# Patient Record
Sex: MALE | Race: WHITE | NOT HISPANIC OR LATINO | Employment: FULL TIME | URBAN - METROPOLITAN AREA
[De-identification: names, ages, dates, MRNs, and addresses within clinical notes are randomized per-mention and may not be internally consistent; named-entity substitution may affect disease eponyms.]

---

## 2017-08-18 ENCOUNTER — GENERIC CONVERSION - ENCOUNTER (OUTPATIENT)
Dept: OTHER | Facility: OTHER | Age: 52
End: 2017-08-18

## 2018-01-09 NOTE — MISCELLANEOUS
Message   Recorded as Task   Date: 03/09/2016 03:57 PM, Created By: Alexi Davis   Task Name: Follow Up   Assigned To: Elaine Porter   Regarding Patient: Leander Harada, Status: Active   Comment:    Alexi Davis - 09 Mar 2016 3:57 PM     TASK CREATED  Please call patient about his cancelled appointments and to make new one please  Left message for patient  Active Problems    1  Benign essential hypertension (401 1) (I10)   2  De Quervain's tenosynovitis (727 04) (M65 4)   3  Gout (274 9) (M10 9)   4  Morbid or severe obesity due to excess calories (278 01) (E66 01)   5  Obstructive sleep apnea (327 23) (G47 33)   6  Postgastrectomy malabsorption (579 3) (K91 2,Z90 3)   7  Pre-operative cardiovascular examination (V72 81) (Z01 810)   8  Status post gastric bypass for obesity (V45 86) (Z98 84)   9  Tenosynovitis of forearm (727 05) (M65 9)   10  Wrist pain (719 43) (M25 539)    Current Meds   1  Calcium 600 MG Oral Tablet; Therapy: (Recorded:11Nov2014) to Recorded   2  Omeprazole 20 MG Oral Capsule Delayed Release; TAKE 1 CAPSULE DAILY; Therapy: 68Lol1557 to (Evaluate:42Lfv7865)  Requested for: 03Jlm4258; Last   Rx:52Chw7361 Ordered   3  Vitamin D3 5000 UNIT Oral Capsule; Therapy: (Recorded:11Nov2014) to Recorded    Allergies    1   No Known Drug Allergies    Signatures   Electronically signed by : Debby Marcos, ; Mar 10 2016  9:05AM EST                       (Author)

## 2018-01-16 NOTE — MISCELLANEOUS
Provider Comments  Provider Comments:     Dear Kaiden Mccarty had a scheduled appointment at our office for 08/21/2017 that you called to cancel but did not reschedule for another day  It is very important that you follow up with us so that we can assess your physical and nutritional safety after your surgery  Please call our office at [phone] to reschedule your appointment       Sincerely,     [facility]            Signatures   Electronically signed by : Mayra Barriga, ; Aug 18 2017  8:35AM EST                       (Author)

## 2018-01-18 NOTE — RESULT NOTES
Dear Celena Elias,   Your test results have returned and are listed below:      Plan  Health Maintenance, Postgastrectomy malabsorption, Status post  gastric bypass for obesity, Vitamin B12 deficiency    · (1) VITAMIN B12; Status:Active; Requested for:98Cfx3509;     Discussion/Summary  Your results show some abnormalities  8/5/16 labs reviewed  Your white blood cell count is low at 3 9  White blood cells help the body fight off infection  If you are having frequent illness or infection, please follow-up with your PCP for further evaluation  This can also be down temporarily after times of rapid weight loss but then should improve  Your hdl or "good cholesterol" is low at 37-please also review this with your PCP at a routine visit  SOMETIMES this can be improved with regular exercise  Exercise as tolerated    One of your liver enzymes (total bilirubin) is a little high at 1 4  This can sometimes be a normal finding when fasting for labs  Your level has stayed a little high with prior labs so I believe this is "normal" for you  You can also review with PCP at a routine visit  Your vitamin B12 level is low at 207-goal after gastric surgery is above 400   Low levels can affect your nerve health  Low levels can also lead to anemia and fatigue  Do NOT take GUMMIE multivitamins because they do not contain enough B vitamins  Alcohol intakes can also lower Vitamin b12 levels  As a gastric surgery patient, it is recommended to avoid alcohol intakes  Recommend that you take an additioanl 1000 mcg of vitamin B12 sublingually (under the tongue) per day  Your level should be rechecked in 3 months-lab slip is enclosed  IMPORTANT: if you have MEDICARE do not get the repeat lab until after December 9, 2016    Please keep your regularly scheduled follow-up appointment  If you do not have a follow-up scheduled, please call the office to schedule a follow-up visit        If you have any questions, please don't hesitate to call the office     Sincerely,      Signatures   Electronically signed by : Stefani Gomez, [facility]; Sep 14 2016  3:32PM EST                       (Author)

## 2018-01-29 ENCOUNTER — TELEPHONE (OUTPATIENT)
Dept: BARIATRICS | Facility: CLINIC | Age: 53
End: 2018-01-29

## 2018-01-29 NOTE — TELEPHONE ENCOUNTER
----- Message from Moe Pete RN sent at 2018  6:03 AM EST -----  Regardin yr  f/u appointment  Contact: [phone]  Please call patient to schedule 2 yr  f/u appointment with office  Thank you

## 2019-02-11 ENCOUNTER — TELEPHONE (OUTPATIENT)
Dept: BARIATRICS | Facility: CLINIC | Age: 54
End: 2019-02-11

## 2019-02-11 NOTE — TELEPHONE ENCOUNTER
----- Message from Giuseppe Maguire RN sent at 2/7/2019  7:27 AM EST -----  Regarding: 3 year f/u  Please contact patient to schedule a 3 year follow up appointment    Thank You

## 2019-04-08 ENCOUNTER — APPOINTMENT (EMERGENCY)
Dept: RADIOLOGY | Facility: HOSPITAL | Age: 54
End: 2019-04-08
Payer: COMMERCIAL

## 2019-04-08 ENCOUNTER — HOSPITAL ENCOUNTER (OUTPATIENT)
Facility: HOSPITAL | Age: 54
Setting detail: OBSERVATION
Discharge: HOME | End: 2019-04-09
Attending: SURGERY | Admitting: SURGERY
Payer: COMMERCIAL

## 2019-04-08 DIAGNOSIS — S06.0X1A CONCUSSION WITH LOSS OF CONSCIOUSNESS OF 30 MINUTES OR LESS, INITIAL ENCOUNTER: Primary | ICD-10-CM

## 2019-04-08 PROBLEM — S06.0X9A CONCUSSION: Status: ACTIVE | Noted: 2019-04-08
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
## 2019-04-08 PROBLEM — S06.0XAA CONCUSSION: Status: ACTIVE | Noted: 2019-04-08

## 2019-04-08 LAB
BASE EXCESS BLDA CALC-SCNC: 2 MMOL/L (ref -2–3)
CA-I BLD-SCNC: 1.14 MMOL/L (ref 1.12–1.32)
GLUCOSE SERPL-MCNC: 95 MG/DL (ref 65–140)
HCO3 BLDA-SCNC: 27.9 MMOL/L (ref 24–30)
HCT VFR BLD CALC: 45 % (ref 36.5–49.3)
HGB BLDA-MCNC: 15.3 G/DL (ref 12–17)
PCO2 BLD: 29 MMOL/L (ref 21–32)
PCO2 BLD: 48.6 MM HG (ref 42–50)
PH BLD: 7.37 [PH] (ref 7.3–7.4)
PO2 BLD: 24 MM HG (ref 35–45)
POTASSIUM BLD-SCNC: 3.8 MMOL/L (ref 3.5–5.3)
SAO2 % BLD FROM PO2: 40 % (ref 95–98)
SODIUM BLD-SCNC: 144 MMOL/L (ref 136–145)
SPECIMEN SOURCE: ABNORMAL

## 2019-04-08 PROCEDURE — 82330 ASSAY OF CALCIUM: CPT

## 2019-04-08 PROCEDURE — 73100 X-RAY EXAM OF WRIST: CPT

## 2019-04-08 PROCEDURE — 90715 TDAP VACCINE 7 YRS/> IM: CPT | Performed by: SURGERY

## 2019-04-08 PROCEDURE — 84132 ASSAY OF SERUM POTASSIUM: CPT

## 2019-04-08 PROCEDURE — 99219 PR INITIAL OBSERVATION CARE/DAY 50 MINUTES: CPT | Performed by: SURGERY

## 2019-04-08 PROCEDURE — 71260 CT THORAX DX C+: CPT

## 2019-04-08 PROCEDURE — 82803 BLOOD GASES ANY COMBINATION: CPT

## 2019-04-08 PROCEDURE — 70450 CT HEAD/BRAIN W/O DYE: CPT

## 2019-04-08 PROCEDURE — 71045 X-RAY EXAM CHEST 1 VIEW: CPT

## 2019-04-08 PROCEDURE — 73110 X-RAY EXAM OF WRIST: CPT

## 2019-04-08 PROCEDURE — 99285 EMERGENCY DEPT VISIT HI MDM: CPT

## 2019-04-08 PROCEDURE — 82947 ASSAY GLUCOSE BLOOD QUANT: CPT

## 2019-04-08 PROCEDURE — 96374 THER/PROPH/DIAG INJ IV PUSH: CPT

## 2019-04-08 PROCEDURE — 85014 HEMATOCRIT: CPT

## 2019-04-08 PROCEDURE — 72125 CT NECK SPINE W/O DYE: CPT

## 2019-04-08 PROCEDURE — 74177 CT ABD & PELVIS W/CONTRAST: CPT

## 2019-04-08 PROCEDURE — 84295 ASSAY OF SERUM SODIUM: CPT

## 2019-04-08 PROCEDURE — NC001 PR NO CHARGE: Performed by: EMERGENCY MEDICINE

## 2019-04-08 PROCEDURE — 90471 IMMUNIZATION ADMIN: CPT

## 2019-04-08 RX ORDER — HEPARIN SODIUM 5000 [USP'U]/ML
5000 INJECTION, SOLUTION INTRAVENOUS; SUBCUTANEOUS EVERY 8 HOURS SCHEDULED
Status: DISCONTINUED | OUTPATIENT
Start: 2019-04-08 | End: 2019-04-09 | Stop reason: HOSPADM

## 2019-04-08 RX ORDER — METHOCARBAMOL 750 MG/1
750 TABLET, FILM COATED ORAL EVERY 6 HOURS SCHEDULED
Status: DISCONTINUED | OUTPATIENT
Start: 2019-04-09 | End: 2019-04-09 | Stop reason: HOSPADM

## 2019-04-08 RX ORDER — GABAPENTIN 100 MG/1
100 CAPSULE ORAL 3 TIMES DAILY
Status: DISCONTINUED | OUTPATIENT
Start: 2019-04-08 | End: 2019-04-09 | Stop reason: HOSPADM

## 2019-04-08 RX ORDER — OXYCODONE HYDROCHLORIDE 5 MG/1
5 TABLET ORAL EVERY 4 HOURS PRN
Status: DISCONTINUED | OUTPATIENT
Start: 2019-04-08 | End: 2019-04-09

## 2019-04-08 RX ORDER — ONDANSETRON 2 MG/ML
4 INJECTION INTRAMUSCULAR; INTRAVENOUS EVERY 6 HOURS PRN
Status: DISCONTINUED | OUTPATIENT
Start: 2019-04-08 | End: 2019-04-09 | Stop reason: HOSPADM

## 2019-04-08 RX ORDER — ACETAMINOPHEN 325 MG/1
975 TABLET ORAL EVERY 8 HOURS SCHEDULED
Status: DISCONTINUED | OUTPATIENT
Start: 2019-04-08 | End: 2019-04-09 | Stop reason: HOSPADM

## 2019-04-08 RX ORDER — SODIUM CHLORIDE, SODIUM GLUCONATE, SODIUM ACETATE, POTASSIUM CHLORIDE, MAGNESIUM CHLORIDE, SODIUM PHOSPHATE, DIBASIC, AND POTASSIUM PHOSPHATE .53; .5; .37; .037; .03; .012; .00082 G/100ML; G/100ML; G/100ML; G/100ML; G/100ML; G/100ML; G/100ML
100 INJECTION, SOLUTION INTRAVENOUS CONTINUOUS
Status: DISCONTINUED | OUTPATIENT
Start: 2019-04-08 | End: 2019-04-09

## 2019-04-08 RX ORDER — SENNOSIDES 8.6 MG
2 TABLET ORAL DAILY
Status: DISCONTINUED | OUTPATIENT
Start: 2019-04-09 | End: 2019-04-09 | Stop reason: HOSPADM

## 2019-04-08 RX ORDER — ONDANSETRON 2 MG/ML
4 INJECTION INTRAMUSCULAR; INTRAVENOUS EVERY 8 HOURS PRN
Status: DISCONTINUED | OUTPATIENT
Start: 2019-04-08 | End: 2019-04-08

## 2019-04-08 RX ORDER — DOCUSATE SODIUM 100 MG/1
100 CAPSULE, LIQUID FILLED ORAL 2 TIMES DAILY
Status: DISCONTINUED | OUTPATIENT
Start: 2019-04-08 | End: 2019-04-09 | Stop reason: HOSPADM

## 2019-04-08 RX ORDER — OXYCODONE HYDROCHLORIDE 10 MG/1
10 TABLET ORAL EVERY 4 HOURS PRN
Status: DISCONTINUED | OUTPATIENT
Start: 2019-04-08 | End: 2019-04-09

## 2019-04-08 RX ORDER — ONDANSETRON 2 MG/ML
1 INJECTION INTRAMUSCULAR; INTRAVENOUS ONCE
Status: COMPLETED | OUTPATIENT
Start: 2019-04-08 | End: 2019-04-08

## 2019-04-08 RX ADMIN — TETANUS TOXOID, REDUCED DIPHTHERIA TOXOID AND ACELLULAR PERTUSSIS VACCINE, ADSORBED 0.5 ML: 5; 2.5; 8; 8; 2.5 SUSPENSION INTRAMUSCULAR at 18:48

## 2019-04-08 RX ADMIN — SODIUM CHLORIDE, SODIUM GLUCONATE, SODIUM ACETATE, POTASSIUM CHLORIDE, MAGNESIUM CHLORIDE, SODIUM PHOSPHATE, DIBASIC, AND POTASSIUM PHOSPHATE 100 ML/HR: .53; .5; .37; .037; .03; .012; .00082 INJECTION, SOLUTION INTRAVENOUS at 22:41

## 2019-04-08 RX ADMIN — ONDANSETRON 4 MG: 2 INJECTION INTRAMUSCULAR; INTRAVENOUS at 20:21

## 2019-04-08 RX ADMIN — ACETAMINOPHEN 975 MG: 325 TABLET ORAL at 22:37

## 2019-04-08 RX ADMIN — GABAPENTIN 100 MG: 100 CAPSULE ORAL at 22:37

## 2019-04-08 RX ADMIN — IOHEXOL 100 ML: 350 INJECTION, SOLUTION INTRAVENOUS at 19:07

## 2019-04-08 RX ADMIN — OXYCODONE HYDROCHLORIDE 10 MG: 10 TABLET ORAL at 20:21

## 2019-04-09 VITALS
DIASTOLIC BLOOD PRESSURE: 94 MMHG | WEIGHT: 185 LBS | OXYGEN SATURATION: 96 % | HEART RATE: 71 BPM | RESPIRATION RATE: 18 BRPM | SYSTOLIC BLOOD PRESSURE: 149 MMHG | TEMPERATURE: 98.4 F

## 2019-04-09 LAB
ANION GAP SERPL CALCULATED.3IONS-SCNC: 4 MMOL/L (ref 4–13)
BUN SERPL-MCNC: 12 MG/DL (ref 5–25)
CALCIUM SERPL-MCNC: 8.2 MG/DL (ref 8.3–10.1)
CHLORIDE SERPL-SCNC: 107 MMOL/L (ref 100–108)
CO2 SERPL-SCNC: 27 MMOL/L (ref 21–32)
CREAT SERPL-MCNC: 0.77 MG/DL (ref 0.6–1.3)
ERYTHROCYTE [DISTWIDTH] IN BLOOD BY AUTOMATED COUNT: 12.7 % (ref 11.6–15.1)
GFR SERPL CREATININE-BSD FRML MDRD: 104 ML/MIN/1.73SQ M
GLUCOSE SERPL-MCNC: 100 MG/DL (ref 65–140)
HCT VFR BLD AUTO: 42.5 % (ref 36.5–49.3)
HGB BLD-MCNC: 14.7 G/DL (ref 12–17)
MCH RBC QN AUTO: 31.7 PG (ref 26.8–34.3)
MCHC RBC AUTO-ENTMCNC: 34.6 G/DL (ref 31.4–37.4)
MCV RBC AUTO: 92 FL (ref 82–98)
PLATELET # BLD AUTO: 146 THOUSANDS/UL (ref 149–390)
PMV BLD AUTO: 10.7 FL (ref 8.9–12.7)
POTASSIUM SERPL-SCNC: 3.8 MMOL/L (ref 3.5–5.3)
RBC # BLD AUTO: 4.63 MILLION/UL (ref 3.88–5.62)
SODIUM SERPL-SCNC: 138 MMOL/L (ref 136–145)
WBC # BLD AUTO: 6.65 THOUSAND/UL (ref 4.31–10.16)

## 2019-04-09 PROCEDURE — NC001 PR NO CHARGE: Performed by: NURSE PRACTITIONER

## 2019-04-09 PROCEDURE — 85027 COMPLETE CBC AUTOMATED: CPT | Performed by: ORTHOPAEDIC SURGERY

## 2019-04-09 PROCEDURE — G8987 SELF CARE CURRENT STATUS: HCPCS

## 2019-04-09 PROCEDURE — 99217 PR OBSERVATION CARE DISCHARGE MANAGEMENT: CPT | Performed by: SURGERY

## 2019-04-09 PROCEDURE — G8980 MOBILITY D/C STATUS: HCPCS

## 2019-04-09 PROCEDURE — 97166 OT EVAL MOD COMPLEX 45 MIN: CPT

## 2019-04-09 PROCEDURE — G8989 SELF CARE D/C STATUS: HCPCS

## 2019-04-09 PROCEDURE — 97162 PT EVAL MOD COMPLEX 30 MIN: CPT

## 2019-04-09 PROCEDURE — G8988 SELF CARE GOAL STATUS: HCPCS

## 2019-04-09 PROCEDURE — G8978 MOBILITY CURRENT STATUS: HCPCS

## 2019-04-09 PROCEDURE — 97127 HB COGNITIVE SKILLS DEVELOPMENT, EACH 15 MUNUTES: CPT

## 2019-04-09 PROCEDURE — G8979 MOBILITY GOAL STATUS: HCPCS

## 2019-04-09 PROCEDURE — 80048 BASIC METABOLIC PNL TOTAL CA: CPT | Performed by: ORTHOPAEDIC SURGERY

## 2019-04-09 PROCEDURE — G0515 COGNITIVE SKILLS DEVELOPMENT: HCPCS

## 2019-04-09 RX ORDER — MECLIZINE HYDROCHLORIDE 25 MG/1
25 TABLET ORAL EVERY 8 HOURS PRN
Status: DISCONTINUED | OUTPATIENT
Start: 2019-04-09 | End: 2019-04-09 | Stop reason: HOSPADM

## 2019-04-09 RX ORDER — LIDOCAINE 50 MG/G
2 PATCH TOPICAL DAILY
Status: DISCONTINUED | OUTPATIENT
Start: 2019-04-09 | End: 2019-04-09 | Stop reason: HOSPADM

## 2019-04-09 RX ORDER — ONDANSETRON 4 MG/1
4 TABLET, FILM COATED ORAL EVERY 8 HOURS PRN
Qty: 20 TABLET | Refills: 0 | Status: SHIPPED | OUTPATIENT
Start: 2019-04-09

## 2019-04-09 RX ORDER — METHYLPREDNISOLONE 4 MG/1
4 TABLET ORAL DAILY
Status: DISCONTINUED | OUTPATIENT
Start: 2019-04-14 | End: 2019-04-09 | Stop reason: HOSPADM

## 2019-04-09 RX ORDER — METHOCARBAMOL 750 MG/1
750 TABLET, FILM COATED ORAL EVERY 6 HOURS SCHEDULED
Qty: 60 TABLET | Refills: 0 | Status: SHIPPED | OUTPATIENT
Start: 2019-04-09 | End: 2019-04-09

## 2019-04-09 RX ORDER — ACETAMINOPHEN 325 MG/1
975 TABLET ORAL EVERY 8 HOURS SCHEDULED
Qty: 30 TABLET | Refills: 0 | Status: SHIPPED | OUTPATIENT
Start: 2019-04-09 | End: 2019-04-09

## 2019-04-09 RX ORDER — METHYLPREDNISOLONE 4 MG/1
TABLET ORAL
Qty: 21 TABLET | Refills: 0 | Status: SHIPPED | OUTPATIENT
Start: 2019-04-09

## 2019-04-09 RX ORDER — SENNOSIDES 8.6 MG
2 TABLET ORAL DAILY
Qty: 120 EACH | Refills: 0 | Status: SHIPPED | OUTPATIENT
Start: 2019-04-10

## 2019-04-09 RX ORDER — MECLIZINE HYDROCHLORIDE 25 MG/1
25 TABLET ORAL EVERY 8 HOURS PRN
Qty: 20 TABLET | Refills: 0 | Status: SHIPPED | OUTPATIENT
Start: 2019-04-09 | End: 2019-04-09

## 2019-04-09 RX ORDER — METHYLPREDNISOLONE 4 MG/1
8 TABLET ORAL DAILY
Status: DISCONTINUED | OUTPATIENT
Start: 2019-04-13 | End: 2019-04-09 | Stop reason: HOSPADM

## 2019-04-09 RX ORDER — METHOCARBAMOL 750 MG/1
750 TABLET, FILM COATED ORAL EVERY 6 HOURS SCHEDULED
Qty: 60 TABLET | Refills: 0 | Status: SHIPPED | OUTPATIENT
Start: 2019-04-09

## 2019-04-09 RX ORDER — GABAPENTIN 100 MG/1
100 CAPSULE ORAL 3 TIMES DAILY
Qty: 30 CAPSULE | Refills: 0 | Status: SHIPPED | OUTPATIENT
Start: 2019-04-09 | End: 2019-04-09

## 2019-04-09 RX ORDER — METHYLPREDNISOLONE 4 MG/1
TABLET ORAL
Qty: 21 EACH | Refills: 0 | Status: SHIPPED | OUTPATIENT
Start: 2019-04-09

## 2019-04-09 RX ORDER — METHYLPREDNISOLONE 16 MG/1
16 TABLET ORAL DAILY
Status: DISCONTINUED | OUTPATIENT
Start: 2019-04-11 | End: 2019-04-09 | Stop reason: HOSPADM

## 2019-04-09 RX ORDER — METOCLOPRAMIDE HYDROCHLORIDE 5 MG/ML
10 INJECTION INTRAMUSCULAR; INTRAVENOUS EVERY 6 HOURS PRN
Status: DISCONTINUED | OUTPATIENT
Start: 2019-04-09 | End: 2019-04-09 | Stop reason: HOSPADM

## 2019-04-09 RX ORDER — GABAPENTIN 100 MG/1
100 CAPSULE ORAL 3 TIMES DAILY
Qty: 30 CAPSULE | Refills: 0 | Status: SHIPPED | OUTPATIENT
Start: 2019-04-09

## 2019-04-09 RX ORDER — METHYLPREDNISOLONE 4 MG/1
12 TABLET ORAL DAILY
Status: DISCONTINUED | OUTPATIENT
Start: 2019-04-12 | End: 2019-04-09 | Stop reason: HOSPADM

## 2019-04-09 RX ORDER — ACETAMINOPHEN 325 MG/1
975 TABLET ORAL EVERY 8 HOURS SCHEDULED
Qty: 30 TABLET | Refills: 0 | Status: SHIPPED | OUTPATIENT
Start: 2019-04-09

## 2019-04-09 RX ORDER — MECLIZINE HYDROCHLORIDE 25 MG/1
25 TABLET ORAL EVERY 8 HOURS PRN
Qty: 20 TABLET | Refills: 0 | Status: SHIPPED | OUTPATIENT
Start: 2019-04-09

## 2019-04-09 RX ADMIN — ONDANSETRON 4 MG: 2 INJECTION INTRAMUSCULAR; INTRAVENOUS at 05:08

## 2019-04-09 RX ADMIN — METHOCARBAMOL 750 MG: 750 TABLET, FILM COATED ORAL at 05:07

## 2019-04-09 RX ADMIN — SODIUM CHLORIDE, SODIUM GLUCONATE, SODIUM ACETATE, POTASSIUM CHLORIDE, MAGNESIUM CHLORIDE, SODIUM PHOSPHATE, DIBASIC, AND POTASSIUM PHOSPHATE 100 ML/HR: .53; .5; .37; .037; .03; .012; .00082 INJECTION, SOLUTION INTRAVENOUS at 08:10

## 2019-04-09 RX ADMIN — METOCLOPRAMIDE 10 MG: 5 INJECTION, SOLUTION INTRAMUSCULAR; INTRAVENOUS at 13:33

## 2019-04-09 RX ADMIN — ACETAMINOPHEN 975 MG: 325 TABLET ORAL at 05:07

## 2019-04-09 RX ADMIN — OXYCODONE HYDROCHLORIDE 10 MG: 10 TABLET ORAL at 05:07

## 2019-04-09 RX ADMIN — METHOCARBAMOL 750 MG: 750 TABLET, FILM COATED ORAL at 00:13

## 2019-04-09 RX ADMIN — OXYCODONE HYDROCHLORIDE 10 MG: 10 TABLET ORAL at 00:54

## 2019-04-09 RX ADMIN — METHYLPREDNISOLONE 24 MG: 16 TABLET ORAL at 13:33

## 2019-04-09 RX ADMIN — LIDOCAINE 2 PATCH: 50 PATCH CUTANEOUS at 11:14

## 2019-04-09 RX ADMIN — METHOCARBAMOL 750 MG: 750 TABLET, FILM COATED ORAL at 11:14

## 2019-04-09 RX ADMIN — ONDANSETRON 4 MG: 2 INJECTION INTRAMUSCULAR; INTRAVENOUS at 11:14

## 2019-04-09 RX ADMIN — ACETAMINOPHEN 975 MG: 325 TABLET ORAL at 13:33

## 2019-04-09 RX ADMIN — METOCLOPRAMIDE 10 MG: 5 INJECTION, SOLUTION INTRAMUSCULAR; INTRAVENOUS at 08:13

## 2019-04-23 ENCOUNTER — OFFICE VISIT (OUTPATIENT)
Dept: SURGERY | Facility: CLINIC | Age: 54
End: 2019-04-23
Payer: COMMERCIAL

## 2019-04-23 VITALS
TEMPERATURE: 97.7 F | BODY MASS INDEX: 28.77 KG/M2 | SYSTOLIC BLOOD PRESSURE: 140 MMHG | WEIGHT: 205.5 LBS | HEIGHT: 71 IN | DIASTOLIC BLOOD PRESSURE: 82 MMHG

## 2019-04-23 DIAGNOSIS — S06.0X1D CONCUSSION WITH LOSS OF CONSCIOUSNESS OF 30 MINUTES OR LESS, SUBSEQUENT ENCOUNTER: ICD-10-CM

## 2019-04-23 DIAGNOSIS — M25.512 ACUTE PAIN OF LEFT SHOULDER: Primary | ICD-10-CM

## 2019-04-23 PROCEDURE — 99214 OFFICE O/P EST MOD 30 MIN: CPT | Performed by: SURGERY

## 2019-05-14 ENCOUNTER — OFFICE VISIT (OUTPATIENT)
Dept: SURGERY | Facility: CLINIC | Age: 54
End: 2019-05-14
Payer: COMMERCIAL

## 2019-05-14 VITALS
DIASTOLIC BLOOD PRESSURE: 82 MMHG | SYSTOLIC BLOOD PRESSURE: 132 MMHG | TEMPERATURE: 97.9 F | BODY MASS INDEX: 28.84 KG/M2 | WEIGHT: 206 LBS | HEIGHT: 71 IN

## 2019-05-14 DIAGNOSIS — M25.512 LEFT SHOULDER PAIN: ICD-10-CM

## 2019-05-14 DIAGNOSIS — M25.519 SHOULDER PAIN: ICD-10-CM

## 2019-05-14 DIAGNOSIS — S06.0X9A CONCUSSION: Primary | ICD-10-CM

## 2019-05-14 PROCEDURE — 99213 OFFICE O/P EST LOW 20 MIN: CPT | Performed by: PHYSICIAN ASSISTANT

## 2019-05-16 PROBLEM — M25.512 LEFT SHOULDER PAIN: Status: ACTIVE | Noted: 2019-05-16

## 2019-07-19 ENCOUNTER — TELEPHONE (OUTPATIENT)
Dept: NEUROLOGY | Facility: CLINIC | Age: 54
End: 2019-07-19

## 2019-08-30 ENCOUNTER — TRANSCRIBE ORDERS (OUTPATIENT)
Dept: ADMINISTRATIVE | Facility: HOSPITAL | Age: 54
End: 2019-08-30

## 2019-08-30 ENCOUNTER — APPOINTMENT (OUTPATIENT)
Dept: LAB | Facility: CLINIC | Age: 54
End: 2019-08-30
Payer: COMMERCIAL

## 2019-08-30 DIAGNOSIS — M25.50 PAIN IN JOINT, MULTIPLE SITES: ICD-10-CM

## 2019-08-30 DIAGNOSIS — R21 RASH AND OTHER NONSPECIFIC SKIN ERUPTION: Primary | ICD-10-CM

## 2019-08-30 DIAGNOSIS — R21 RASH AND OTHER NONSPECIFIC SKIN ERUPTION: ICD-10-CM

## 2019-08-30 DIAGNOSIS — Z98.890 S/P LAPAROSCOPIC SURGERY: ICD-10-CM

## 2019-08-30 DIAGNOSIS — R53.83 OTHER FATIGUE: ICD-10-CM

## 2019-08-30 LAB
25(OH)D3 SERPL-MCNC: 26 NG/ML (ref 30–100)
ALBUMIN SERPL BCP-MCNC: 4.6 G/DL (ref 3.5–5)
ALP SERPL-CCNC: 129 U/L (ref 46–116)
ALT SERPL W P-5'-P-CCNC: 18 U/L (ref 12–78)
ANION GAP SERPL CALCULATED.3IONS-SCNC: 6 MMOL/L (ref 4–13)
AST SERPL W P-5'-P-CCNC: 15 U/L (ref 5–45)
BASOPHILS # BLD AUTO: 0.03 THOUSANDS/ΜL (ref 0–0.1)
BASOPHILS NFR BLD AUTO: 1 % (ref 0–1)
BILIRUB SERPL-MCNC: 1.4 MG/DL (ref 0.2–1)
BUN SERPL-MCNC: 16 MG/DL (ref 5–25)
CALCIUM SERPL-MCNC: 9.3 MG/DL (ref 8.3–10.1)
CHLORIDE SERPL-SCNC: 108 MMOL/L (ref 100–108)
CHOLEST SERPL-MCNC: 170 MG/DL (ref 50–200)
CO2 SERPL-SCNC: 30 MMOL/L (ref 21–32)
CREAT SERPL-MCNC: 0.82 MG/DL (ref 0.6–1.3)
EOSINOPHIL # BLD AUTO: 0.05 THOUSAND/ΜL (ref 0–0.61)
EOSINOPHIL NFR BLD AUTO: 1 % (ref 0–6)
ERYTHROCYTE [DISTWIDTH] IN BLOOD BY AUTOMATED COUNT: 12.8 % (ref 11.6–15.1)
FERRITIN SERPL-MCNC: 37 NG/ML (ref 8–388)
GFR SERPL CREATININE-BSD FRML MDRD: 101 ML/MIN/1.73SQ M
GLUCOSE P FAST SERPL-MCNC: 93 MG/DL (ref 65–99)
HCT VFR BLD AUTO: 46.6 % (ref 36.5–49.3)
HDLC SERPL-MCNC: 58 MG/DL (ref 40–60)
HGB BLD-MCNC: 15.6 G/DL (ref 12–17)
IMM GRANULOCYTES # BLD AUTO: 0 THOUSAND/UL (ref 0–0.2)
IMM GRANULOCYTES NFR BLD AUTO: 0 % (ref 0–2)
LDLC SERPL CALC-MCNC: 95 MG/DL (ref 0–100)
LYMPHOCYTES # BLD AUTO: 0.87 THOUSANDS/ΜL (ref 0.6–4.47)
LYMPHOCYTES NFR BLD AUTO: 25 % (ref 14–44)
MCH RBC QN AUTO: 31.3 PG (ref 26.8–34.3)
MCHC RBC AUTO-ENTMCNC: 33.5 G/DL (ref 31.4–37.4)
MCV RBC AUTO: 94 FL (ref 82–98)
MONOCYTES # BLD AUTO: 0.24 THOUSAND/ΜL (ref 0.17–1.22)
MONOCYTES NFR BLD AUTO: 7 % (ref 4–12)
NEUTROPHILS # BLD AUTO: 2.35 THOUSANDS/ΜL (ref 1.85–7.62)
NEUTS SEG NFR BLD AUTO: 66 % (ref 43–75)
NONHDLC SERPL-MCNC: 112 MG/DL
NRBC BLD AUTO-RTO: 0 /100 WBCS
PLATELET # BLD AUTO: 164 THOUSANDS/UL (ref 149–390)
PMV BLD AUTO: 11.1 FL (ref 8.9–12.7)
POTASSIUM SERPL-SCNC: 4.1 MMOL/L (ref 3.5–5.3)
PROT SERPL-MCNC: 7 G/DL (ref 6.4–8.2)
RBC # BLD AUTO: 4.98 MILLION/UL (ref 3.88–5.62)
SODIUM SERPL-SCNC: 144 MMOL/L (ref 136–145)
TIBC SERPL-MCNC: 364 UG/DL (ref 250–450)
TRIGL SERPL-MCNC: 87 MG/DL
TSH SERPL DL<=0.05 MIU/L-ACNC: 1.32 UIU/ML (ref 0.36–3.74)
VIT B12 SERPL-MCNC: 235 PG/ML (ref 100–900)
WBC # BLD AUTO: 3.54 THOUSAND/UL (ref 4.31–10.16)

## 2019-08-30 PROCEDURE — 82306 VITAMIN D 25 HYDROXY: CPT

## 2019-08-30 PROCEDURE — 82607 VITAMIN B-12: CPT

## 2019-08-30 PROCEDURE — 80053 COMPREHEN METABOLIC PANEL: CPT

## 2019-08-30 PROCEDURE — 82728 ASSAY OF FERRITIN: CPT

## 2019-08-30 PROCEDURE — 84443 ASSAY THYROID STIM HORMONE: CPT

## 2019-08-30 PROCEDURE — 83550 IRON BINDING TEST: CPT

## 2019-08-30 PROCEDURE — 36415 COLL VENOUS BLD VENIPUNCTURE: CPT

## 2019-08-30 PROCEDURE — 80061 LIPID PANEL: CPT

## 2019-08-30 PROCEDURE — 85025 COMPLETE CBC W/AUTO DIFF WBC: CPT

## 2019-08-30 PROCEDURE — 86618 LYME DISEASE ANTIBODY: CPT

## 2019-08-31 LAB — B BURGDOR IGG+IGM SER-ACNC: <0.91 ISR (ref 0–0.9)

## 2021-03-22 ENCOUNTER — TRANSCRIBE ORDERS (OUTPATIENT)
Dept: PHYSICAL THERAPY | Age: 56
End: 2021-03-22

## 2021-03-22 ENCOUNTER — EVALUATION (OUTPATIENT)
Dept: PHYSICAL THERAPY | Age: 56
End: 2021-03-22
Payer: COMMERCIAL

## 2021-03-22 DIAGNOSIS — R42 VERTIGO: Primary | ICD-10-CM

## 2021-03-22 DIAGNOSIS — R42 DIZZINESS AND GIDDINESS: Primary | ICD-10-CM

## 2021-03-22 DIAGNOSIS — H81.11 BPPV (BENIGN PAROXYSMAL POSITIONAL VERTIGO), RIGHT: ICD-10-CM

## 2021-03-22 PROCEDURE — 97110 THERAPEUTIC EXERCISES: CPT | Performed by: PHYSICAL THERAPIST

## 2021-03-22 PROCEDURE — 95992 CANALITH REPOSITIONING PROC: CPT | Performed by: PHYSICAL THERAPIST

## 2021-03-22 PROCEDURE — 97162 PT EVAL MOD COMPLEX 30 MIN: CPT | Performed by: PHYSICAL THERAPIST

## 2021-03-22 NOTE — LETTER
2021    Skye Perez MD  [address]  [place]  [address] [place] [address] [address] [address]    Patient: Randall Caballero   YOB: 1965   Date of Visit: 3/22/2021     Encounter Diagnosis     ICD-10-CM    1  Vertigo  R42    2  BPPV (benign paroxysmal positional vertigo), right  H81 11        Dear Dr Adriane Anthonyivers: Thank you for your recent referral of Randall Caballero  Please review the attached evaluation summary from Jay's recent visit  Please verify that you agree with the plan of care by signing the attached order  If you have any questions or concerns, please do not hesitate to call  I sincerely appreciate the opportunity to share in the care of one of your patients and hope to have another opportunity to work with you in the near future  Sincerely,    Siena Hudson, PT      Referring Provider:      I certify that I have read the below Plan of Care and certify the need for these services furnished under this plan of treatment while under my care  Skye Perez, [address] [place] [address] [address]  [address] [address]  Via Fax: [phone]          PT Evaluation     Today's date: 3/22/2021  Patient name: Randall Caballero  : 1965  MRN: [id number]  Referring provider: Santana Krishnamurthy MD  Dx:   Encounter Diagnosis     ICD-10-CM    1  Vertigo  R42    2  BPPV (benign paroxysmal positional vertigo), right  H81 11        Start Time: 1345  Stop Time: 1445  Total time in clinic (min): 60 minutes    Assessment  Assessment details: Randall Caballero is a 56 y/o male with a history of recurrent vertigo with positional changes including rolling in bed, supine to sit and turning head quickly  He had no deficits observed with smooth pursuit, saccades and no resting nystagmus  He had no specific balance deficits but was more challenged in conditions with his eyes close   Positional testing did provoke an upbeat nystagmus in the right Dixhalpike position with a 20 second latency and 20 second duration  At this time, Oleg Cui would benefit from skilled physical therapy to address the noted deficits and abolish the signs and symptoms consistent with the referring diagnosis of vertigo with a physical therapy diagnosis consistent with right posterior canal BPPV  Other impairment: vestibular dysfucntion  Understanding of Dx/Px/POC: good  Goals  STG 1-2 weeks  1  Decrease c/o dizziness/vertigo by 50%  2  Independent transfers with ease from supine-sit  LTG 4 weeks  1  Resolve symptoms of vertigo  2  Independent HEP for home Epley maneuver/self management  3  DHI<10%  4  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  Plan  Patient would benefit from: skilled physical therapy  Planned therapy interventions: therapeutic exercise, therapeutic activities, patient education, canalith repositioning and home exercise program  Frequency: 2x week  Duration in weeks: 4  Plan of Care beginning date: 3/22/2021  Plan of Care expiration date: 6/22/2021        Subjective Evaluation    History of Present Illness  Mechanism of injury: Pt had vertigo since October, had 2 sessions of therapy at another facility and was prescribed meclizine  He noted no improvement  He returned to his physician this past week and requested to return to our facility as he had prior success with vestibular therapy  Patient Goals  Patient goal: resolve symptoms of vertigo        Objective     Concurrent Complaints  Positive for headaches (migraines), nausea/motion sickness, aural fullness and peripheral neuropathy (in hands)  Negative for tinnitus, visual change, hearing loss, memory loss and poor concentration    Active Range of Motion   Cervical/Thoracic Spine       Cervical    Flexion: Neck active flexion: WFL  Extension: Neck active extension: WFL  Left lateral flexion: Neck active lateral bend left: WFL  Right lateral flexion: Neck active lateral bend right: WFL        Left rotation: Neck active rotation left: WFL  Right rotation: Neck active rotation right: WFL  Neuro Exam:     Dizziness  Positive for disequilibrium, vertigo and light-headedness  Negative for oscillopsia, motion sickness and diplopia  Exacerbating factors  Positive for rolling in bed, looking up, turning head and supine to/from sitting  Negative for bending over, walking, optokinetic movement and walking in busy environment  Headaches   Patient reports headaches: Yes (migraines)       Oculomotor exam   Oculomotor ROM: WNL  Resting nystagmus: not present   Gaze holding nystagmus: not present left   Smooth pursuits: within normal limits  Vertical saccades: normal  Horizontal saccades: normal    Positional testing   [place]-Hallpike   Left posterior canal: WNL  Right posterior canal: upbeating  [place]pike comment: 20 sec latency, 20 sec duration  Roll test   Left horizontal canal: WNL  Right horizontal canal: WNL      Balance assessments   MCTSIB   Eyes open level surface: 30  Eyes open foam surface: 30  Eyes closed level surface: 30  Eyes closed foam surface: 30      Flowsheet Rows      Most Recent Value   PT/OT G-Codes   Current Score  67   Projected Score  79             Precautions:standard      Procedure 3/22            Right Epley 3x                                                   TherEx             HEP 10'                                                                                                                                                                                                               Ther Activity                                       Gait Training                                       Modalities                                        HEP- home Epley manuever

## 2021-03-22 NOTE — PROGRESS NOTES
PT Evaluation     Today's date: 3/22/2021  Patient name: Ursula Argueta  : 1965  MRN: [id number]  Referring provider: Deepti Fields MD  Dx:   Encounter Diagnosis     ICD-10-CM    1  Vertigo  R42    2  BPPV (benign paroxysmal positional vertigo), right  H81 11        Start Time: 1345  Stop Time: 1445  Total time in clinic (min): 60 minutes    Assessment  Assessment details: Ursula Argueta is a 56 y/o male with a history of recurrent vertigo with positional changes including rolling in bed, supine to sit and turning head quickly  He had no deficits observed with smooth pursuit, saccades and no resting nystagmus  He had no specific balance deficits but was more challenged in conditions with his eyes close  Positional testing did provoke an upbeat nystagmus in the right Dixhalpike position with a 20 second latency and 20 second duration  At this time, Kourtney Malcolm would benefit from skilled physical therapy to address the noted deficits and abolish the signs and symptoms consistent with the referring diagnosis of vertigo with a physical therapy diagnosis consistent with right posterior canal BPPV  Other impairment: vestibular dysfucntion  Understanding of Dx/Px/POC: good  Goals  STG 1-2 weeks  1  Decrease c/o dizziness/vertigo by 50%  2  Independent transfers with ease from supine-sit  LTG 4 weeks  1  Resolve symptoms of vertigo  2  Independent HEP for home Epley maneuver/self management  3  DHI<10%  4  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        Plan  Patient would benefit from: skilled physical therapy  Planned therapy interventions: therapeutic exercise, therapeutic activities, patient education, canalith repositioning and home exercise program  Frequency: 2x week  Duration in weeks: 4  Plan of Care beginning date: 3/22/2021  Plan of Care expiration date: 2021        Subjective Evaluation    History of Present Illness  Mechanism of injury: Pt had vertigo since October, had 2 sessions of therapy at another facility and was prescribed meclizine  He noted no improvement  He returned to his physician this past week and requested to return to our facility as he had prior success with vestibular therapy  Patient Goals  Patient goal: resolve symptoms of vertigo        Objective     Concurrent Complaints  Positive for headaches (migraines), nausea/motion sickness, aural fullness and peripheral neuropathy (in hands)  Negative for tinnitus, visual change, hearing loss, memory loss and poor concentration    Active Range of Motion   Cervical/Thoracic Spine       Cervical    Flexion: Neck active flexion: WFL  Extension: Neck active extension: WFL  Left lateral flexion: Neck active lateral bend left: WFL  Right lateral flexion: Neck active lateral bend right: WFL  Left rotation: Neck active rotation left: WFL  Right rotation: Neck active rotation right: WFL  Neuro Exam:     Dizziness  Positive for disequilibrium, vertigo and light-headedness  Negative for oscillopsia, motion sickness and diplopia  Exacerbating factors  Positive for rolling in bed, looking up, turning head and supine to/from sitting  Negative for bending over, walking, optokinetic movement and walking in busy environment  Headaches   Patient reports headaches: Yes (migraines)       Oculomotor exam   Oculomotor ROM: WNL  Resting nystagmus: not present   Gaze holding nystagmus: not present left   Smooth pursuits: within normal limits  Vertical saccades: normal  Horizontal saccades: normal    Positional testing   [place]-Hallpike   Left posterior canal: WNL  Right posterior canal: upbeating  [place]-Hallpike comment: 20 sec latency, 20 sec duration  Roll test   Left horizontal canal: WNL  Right horizontal canal: WNL      Balance assessments   MCTSIB   Eyes open level surface: 30  Eyes open foam surface: 30  Eyes closed level surface: 30  Eyes closed foam surface: Rima Stern 2420      Most Recent Value   PT/OT G-Codes   Current Score  67   Projected Score  79             Precautions:standard      Procedure 3/22            Right Epley 3x                                                   TherEx             HEP 10'                                                                                                                                                                                                               Ther Activity                                       Gait Training                                       Modalities                                        HEP- home Epley manuever

## 2021-03-24 ENCOUNTER — OFFICE VISIT (OUTPATIENT)
Dept: PHYSICAL THERAPY | Age: 56
End: 2021-03-24
Payer: COMMERCIAL

## 2021-03-24 DIAGNOSIS — H81.11 BPPV (BENIGN PAROXYSMAL POSITIONAL VERTIGO), RIGHT: Primary | ICD-10-CM

## 2021-03-24 DIAGNOSIS — R42 VERTIGO: ICD-10-CM

## 2021-03-24 PROCEDURE — 95992 CANALITH REPOSITIONING PROC: CPT | Performed by: PHYSICAL THERAPIST

## 2021-03-24 NOTE — PROGRESS NOTES
Daily Note     Today's date: 3/24/2021  Patient name: Christina Modi  : 1965  MRN: [id number]  Referring provider: Margarita Nice MD  Dx:   Encounter Diagnosis     ICD-10-CM    1  BPPV (benign paroxysmal positional vertigo), right  H81 11    2  Vertigo  R42        Start Time: 8779  Stop Time: 3506  Total time in clinic (min): 30 minutes    Subjective: "a little better"      Objective: See treatment diary below      Assessment: Tolerated treatment well  Produced upbeat nystagmus on 3 consecutive maneuver with latency>10 sec, duration <10 seconds today  On 4th maneuver, no nystagmus or symptoms produced  Plan: Continue per plan of care        Precautions:standard      Procedure 3/22 3/24           Right Epley 3x 4x                                                  TherEx             HEP 10'                                                                                                                                                                                                               Ther Activity                                       Gait Training                                       Modalities

## 2021-07-01 ENCOUNTER — OFFICE VISIT (OUTPATIENT)
Dept: URGENT CARE | Facility: CLINIC | Age: 56
End: 2021-07-01
Payer: COMMERCIAL

## 2021-07-01 VITALS
BODY MASS INDEX: 30.1 KG/M2 | OXYGEN SATURATION: 97 % | WEIGHT: 215 LBS | RESPIRATION RATE: 18 BRPM | HEIGHT: 71 IN | DIASTOLIC BLOOD PRESSURE: 93 MMHG | SYSTOLIC BLOOD PRESSURE: 151 MMHG | TEMPERATURE: 97.8 F | HEART RATE: 69 BPM

## 2021-07-01 DIAGNOSIS — S61.212A LACERATION OF RIGHT MIDDLE FINGER WITHOUT FOREIGN BODY WITHOUT DAMAGE TO NAIL, INITIAL ENCOUNTER: Primary | ICD-10-CM

## 2021-07-01 PROCEDURE — 99203 OFFICE O/P NEW LOW 30 MIN: CPT | Performed by: PHYSICIAN ASSISTANT

## 2021-07-01 NOTE — PROGRESS NOTES
[facility]        NAME: Robert Wyatt is a 55 y o  male  : 1965    MRN: [id number]  DATE: 2021  TIME: 1:16 PM    Assessment and Plan   Laceration of right middle finger without foreign body without damage to nail, initial encounter [S61 212A]  1  Laceration of right middle finger without foreign body without damage to nail, initial encounter           Patient Instructions   There are no Patient Instructions on file for this visit  Follow up with PCP in 3-5 days  Proceed to  ER if symptoms worsen  Chief Complaint     Chief Complaint   Patient presents with    Finger Laceration     right 3rd finger, sliced it on a mandolin slicer about noon today          History of Present Illness       Patient is a 29-year-old male presenting with a laceration over the distal tip of his right 3rd finger  Patient cut it on a daily slicer cutting tomatoes  Last tetanus was in 2018  No numbness/ tingling or restriction of movement of the finger  Review of Systems   Review of Systems   Musculoskeletal: Negative for joint swelling  Skin: Positive for wound  Neurological: Negative for weakness and numbness           Current Medications       Current Outpatient Medications:     acetaminophen (TYLENOL) 325 mg tablet, Take 3 tablets (975 mg total) by mouth every 8 (eight) hours (Patient not taking: Reported on 2021), Disp: 30 tablet, Rfl: 0    gabapentin (NEURONTIN) 100 mg capsule, Take 1 capsule (100 mg total) by mouth 3 (three) times a day (Patient not taking: Reported on 2019), Disp: 30 capsule, Rfl: 0    meclizine (ANTIVERT) 25 mg tablet, Take 1 tablet (25 mg total) by mouth every 8 (eight) hours as needed for dizziness (Patient not taking: Reported on 2021), Disp: 20 tablet, Rfl: 0    methocarbamol (ROBAXIN) 750 mg tablet, Take 1 tablet (750 mg total) by mouth every 6 (six) hours (Patient not taking: Reported on 2021), Disp: 60 tablet, Rfl: 0    methylPREDNISolone 4 MG tablet therapy pack, Use as directed on package (Patient not taking: Reported on 4/23/2019), Disp: 21 each, Rfl: 0    methylPREDNISolone 4 MG tablet therapy pack, Use as directed on package (Patient not taking: Reported on 4/23/2019), Disp: 21 tablet, Rfl: 0    ondansetron (ZOFRAN) 4 mg tablet, Take 1 tablet (4 mg total) by mouth every 8 (eight) hours as needed for nausea or vomiting (Patient not taking: Reported on 5/14/2019), Disp: 20 tablet, Rfl: 0    senna (SENOKOT) 8 6 mg, Take 2 tablets (17 2 mg total) by mouth daily (Patient not taking: Reported on 4/23/2019), Disp: 120 each, Rfl: 0    Current Allergies     Allergies as of 07/01/2021    (No Known Allergies)            The following portions of the patient's history were reviewed and updated as appropriate: allergies, current medications, past family history, past medical history, past social history, past surgical history and problem list      Past Medical History:   Diagnosis Date    Hypertension        Past Surgical History:   Procedure Laterality Date    BACK SURGERY      GASTRIC BYPASS      KNEE SURGERY      SHOULDER SURGERY         Family History   Problem Relation Age of Onset    Heart attack Father     Diabetes Father          Medications have been verified  Objective   /93   Pulse 69   Temp 97 8 °F (36 6 °C) (Temporal)   Resp 18   Ht 5' 11" (1 803 m)   Wt 97 5 kg (215 lb)   SpO2 97%   BMI 29 99 kg/m²          Physical Exam     Physical Exam  Constitutional:       Appearance: Normal appearance  Musculoskeletal:         General: Normal range of motion  Skin:     Comments: Has approximately 1 cm U shaped laceration over the distal tip of the right 3rd finger  There is still a thin flap of skin held on but the area of skin appears discolored  Neurological:      Mental Status: He is alert     Psychiatric:         Mood and Affect: Mood normal          Behavior: Behavior normal        Laceration repair    Date/Time: 7/1/2021 1:15 PM  Performed by: Aura Gomez PA-C  Authorized by: Aura Gomez PA-C   Consent: Verbal consent obtained    Risks and benefits: risks, benefits and alternatives were discussed  Consent given by: patient  Patient understanding: patient states understanding of the procedure being performed  Patient consent: the patient's understanding of the procedure matches consent given  Patient identity confirmed: verbally with patient  Body area: upper extremity  Location details: right long finger  Laceration length: 1 cm  Foreign bodies: no foreign bodies  Tendon involvement: none  Nerve involvement: none  Vascular damage: no      Procedure Details:  Irrigation solution: saline  Irrigation method: syringe  Amount of cleaning: extensive  Skin closure: Steri-Strips  Approximation difficulty: simple  Dressing: non-adhesive packing strip, gauze roll and splint  Patient tolerance: patient tolerated the procedure well with no immediate complications

## 2021-07-28 ENCOUNTER — OFFICE VISIT (OUTPATIENT)
Dept: PHYSICAL THERAPY | Age: 56
End: 2021-07-28
Payer: COMMERCIAL

## 2021-07-28 DIAGNOSIS — R42 DIZZINESS: ICD-10-CM

## 2021-07-28 DIAGNOSIS — M54.2 NECK PAIN ON LEFT SIDE: Primary | ICD-10-CM

## 2021-07-28 PROCEDURE — 97162 PT EVAL MOD COMPLEX 30 MIN: CPT | Performed by: PHYSICAL THERAPIST

## 2021-07-28 PROCEDURE — 97110 THERAPEUTIC EXERCISES: CPT | Performed by: PHYSICAL THERAPIST
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
## 2021-08-04 ENCOUNTER — OFFICE VISIT (OUTPATIENT)
Dept: PHYSICAL THERAPY | Age: 56
End: 2021-08-04
Payer: COMMERCIAL

## 2021-08-04 DIAGNOSIS — R42 DIZZINESS: ICD-10-CM

## 2021-08-04 DIAGNOSIS — M54.2 NECK PAIN ON LEFT SIDE: Primary | ICD-10-CM

## 2021-08-04 PROCEDURE — 97140 MANUAL THERAPY 1/> REGIONS: CPT | Performed by: PHYSICAL THERAPIST

## 2021-08-04 PROCEDURE — 97110 THERAPEUTIC EXERCISES: CPT | Performed by: PHYSICAL THERAPIST
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
## 2021-08-18 ENCOUNTER — APPOINTMENT (OUTPATIENT)
Dept: PHYSICAL THERAPY | Age: 56
End: 2021-08-18
Payer: COMMERCIAL

## 2021-10-25 NOTE — PROGRESS NOTES
Pt went on vacation and did not return for PT after his return  Pt did not return phone call   Discharge PT

## 2021-12-27 ENCOUNTER — TELEPHONE (OUTPATIENT)
Dept: UROLOGY | Facility: MEDICAL CENTER | Age: 56
End: 2021-12-27

## 2022-02-09 ENCOUNTER — OFFICE VISIT (OUTPATIENT)
Dept: PHYSICAL THERAPY | Age: 57
End: 2022-02-09
Payer: COMMERCIAL

## 2022-02-09 DIAGNOSIS — H81.11 BPPV (BENIGN PAROXYSMAL POSITIONAL VERTIGO), RIGHT: ICD-10-CM

## 2022-02-09 DIAGNOSIS — R42 VERTIGO: Primary | ICD-10-CM

## 2022-02-09 PROCEDURE — 95992 CANALITH REPOSITIONING PROC: CPT | Performed by: PHYSICAL THERAPIST

## 2022-02-09 PROCEDURE — 97161 PT EVAL LOW COMPLEX 20 MIN: CPT | Performed by: PHYSICAL THERAPIST

## 2022-02-09 NOTE — PROGRESS NOTES
PT Evaluation     Today's date: 2022  Patient name: Chad Napier  : 1965  MRN: [id number]  Referring provider: Samantha George PT  Dx:   Encounter Diagnosis     ICD-10-CM    1  Vertigo  R42    2  BPPV (benign paroxysmal positional vertigo), right  H81 11        Start Time: 830  Stop Time: 930  Total time in clinic (min): 60 minutes    Assessment  Assessment details: Veta Hamman is a 56 yo male with acute onset of true spin vertigo 1 week ago when rolling to the right and getting out of bed  He has a prior history of bppv and was treated successfully with PT  He did have a nystagmus produced with left gaze today  Initially he produced an upbeat torsional nystagmus in left DixHalpike position lasting 5 sec and then after several attempts did also display a positive right DixHalpike that lasted 15sec after a 5 sec latency  Exam findings are consistent with a physical therapy diagnosis of Right BPPV but may also include a bilateral component  The patient would benefit from skilled physical therapy to address his impairments, improve his quality of life and restore his prior functional level  Other impairment: vestiular impairment  Functional limitations: dificulty with tranfers inclsuing rolling in bed, supine-sit, turning head, beding over  Goals  STG 1-2 weeks  1  Decrease c/o dizziness/vertigo by > 50%  2  Independent transfers with ease from supine-sit  LTG 2-4weeks  1  Resolve symptoms of vertigo  2  Independent HEP for home Epley maneuver/self management  3  DHI<10%  4  Pt will be able to perform all adls/iadls/transfers/mobility as prior  Plan  Plan details: PT 1-2x per week prn to relieve symptoms of vertigo    Patient would benefit from: skilled physical therapy  Planned therapy interventions: canalith repositioning, patient education and home exercise program  Frequency: 2x week  Duration in weeks: 4  Plan of Care beginning date: 2022  Plan of Care expiration date: 5/9/2022        Subjective Evaluation    History of Present Illness  Mechanism of injury: Pt presents via direct access and reports about a week ago he awoke with vertigo when turning to the right in bed  He has a prior history of BPPV and was treated successfully with PT and the Epley maneuver  He does report he had previous imaging on his neck and sinuses and does have right sinus issues  Patient Goals  Patient goal: resolve vertigo        Objective     Concurrent Complaints  Negative for nausea/motion sickness, tinnitus, visual change, hearing loss, memory loss, aural fullness, poor concentration and peripheral neuropathy    Active Range of Motion   Cervical/Thoracic Spine       Cervical    Flexion: Neck active flexion: WFL  Extension: Neck active extension: WFL  Left lateral flexion: Neck active lateral bend left: WFL  Right lateral flexion: Neck active lateral bend right: WFL  Left rotation: Neck active rotation left: WFL  Right rotation: Neck active rotation right: WFL  Neuro Exam:     Dizziness  Positive for vertigo  Negative for disequilibrium, oscillopsia, motion sickness, light-headedness, rocking or swaying, diplopia and floating or swimming  Exacerbating factors  Positive for bending over, rolling in bed, turning head and supine to/from sitting  Negative for walking, optokinetic movement and walking in busy environment  Symptoms   Duration: seconds   Frequency: 1x per day upon waking    Cervical exam   Ligament Laxity Testing   Alar ligament: WNL  Sharp Sean:  WNL  Modified VBI   Left: asymptomatic  Right: asymptomatic    Oculomotor exam   Oculomotor ROM: WNL  Resting nystagmus: not present   Gaze holding nystagmus: present left  Gaze holding nystagmus: not present right  Smooth pursuits: within normal limits  Vertical saccades: normal  Horizontal saccades: normal    Positional testing   [place]-Hallpike   Left posterior canal: symptomatic, torsional and upbeating  Right posterior canal: symptomatic, torsional and upbeating  [place]rell-Hallpike comment: 15sec duration             Precautions: standard      Procedure 2/8            R Epley 3x                                                                                                                                                           Ther Ex                                                                                                                     Ther Activity                                       Gait Training                                       Modalities

## 2022-02-14 ENCOUNTER — OFFICE VISIT (OUTPATIENT)
Dept: PHYSICAL THERAPY | Age: 57
End: 2022-02-14
Payer: COMMERCIAL

## 2022-02-14 DIAGNOSIS — R42 VERTIGO: ICD-10-CM

## 2022-02-14 DIAGNOSIS — H81.11 BPPV (BENIGN PAROXYSMAL POSITIONAL VERTIGO), RIGHT: Primary | ICD-10-CM

## 2022-02-14 PROCEDURE — 95992 CANALITH REPOSITIONING PROC: CPT | Performed by: PHYSICAL THERAPIST

## 2022-02-14 PROCEDURE — 97140 MANUAL THERAPY 1/> REGIONS: CPT | Performed by: PHYSICAL THERAPIST

## 2022-02-14 NOTE — PROGRESS NOTES
Daily Note     Today's date: 2022  Patient name: Felicity Hernadez  : 1965  MRN: [id number]  Referring provider: Kenny Auguste PT  Dx:   Encounter Diagnosis     ICD-10-CM    1  BPPV (benign paroxysmal positional vertigo), right  H81 11    2  Vertigo  R42        Start Time: 3400  Stop Time: 1800  Total time in clinic (min): 30 minutes    Subjective: "It feels different now"  Pt reports he no longer feels dizzy specifically when he rolls right but he does feel when he moves form supine to sit and at time nauseous  Pt reports his symtpoms are always worse upon waking and by the end of the day he feels ok  Objective: See treatment diary below      Assessment: Suspect B BPPV as pt did have nystagmus present on both sides last week on IE  Performed right Epley with no evidence of nystagmus and minimal subjective dizziness  Performed left Epley and iniitally upon rotating to right, pt c-spine "locked" and unable to finish maneuver  Worked on cervical mobility with prone CTJ HVLA and cervical snag and then able to complete maneuver on left  All instability testing negative for c-spine, perfromed pre-mobilization hold in prone prior to HVLA  Improved right cervical rotation post-mob  Plan: Continue per plan of care        Precautions: standard      Procedure            R Epley 3x Rx1  Lx1           Manual             cervcial SNAG sitting  5x rot           CTJ mob  performed                                                                                                                   Ther Ex                                                                                                                     Ther Activity                                       Gait Training                                       Modalities

## 2022-02-21 ENCOUNTER — OFFICE VISIT (OUTPATIENT)
Dept: PHYSICAL THERAPY | Age: 57
End: 2022-02-21
Payer: COMMERCIAL

## 2022-02-21 DIAGNOSIS — H81.11 BPPV (BENIGN PAROXYSMAL POSITIONAL VERTIGO), RIGHT: Primary | ICD-10-CM

## 2022-02-21 DIAGNOSIS — R42 VERTIGO: ICD-10-CM

## 2022-02-21 PROCEDURE — 95992 CANALITH REPOSITIONING PROC: CPT | Performed by: PHYSICAL THERAPIST

## 2022-02-21 NOTE — PROGRESS NOTES
Daily Note     Today's date: 2022  Patient name: Jorge Hope  : 1965  MRN: [id number]  Referring provider: Anjali Walter PT  Dx:   Encounter Diagnosis     ICD-10-CM    1  BPPV (benign paroxysmal positional vertigo), right  H81 11    2  Vertigo  R42        Start Time: 730  Stop Time: 2692  Total time in clinic (min): 25 minutes    Subjective: Pt reports he is less dizzy but still having episodes when getting OOB  Objective: See treatment diary below      Assessment: Symptomatic to left side and then when bringing head through to right side  Nystagmus visible in left test position and in right rotated  Pt symptomatic when moving from supine to sit on right side moving to left  Plan: Continue per plan of care        Precautions: standard      Procedure           R Epley 3x Rx1  Lx1 Lx3  Rx1          Manual             cervcial SNAG sitting  5x rot           CTJ mob  performed                                                                                                                   Ther Ex                                                                                                                     Ther Activity                                       Gait Training                                       Modalities

## 2022-02-23 ENCOUNTER — APPOINTMENT (OUTPATIENT)
Dept: PHYSICAL THERAPY | Age: 57
End: 2022-02-23
Payer: COMMERCIAL

## 2022-02-28 ENCOUNTER — OFFICE VISIT (OUTPATIENT)
Dept: PHYSICAL THERAPY | Age: 57
End: 2022-02-28
Payer: COMMERCIAL

## 2022-02-28 DIAGNOSIS — H81.11 BPPV (BENIGN PAROXYSMAL POSITIONAL VERTIGO), RIGHT: Primary | ICD-10-CM

## 2022-02-28 DIAGNOSIS — R42 VERTIGO: ICD-10-CM

## 2022-02-28 PROCEDURE — 97110 THERAPEUTIC EXERCISES: CPT | Performed by: PHYSICAL THERAPIST

## 2022-02-28 PROCEDURE — 95992 CANALITH REPOSITIONING PROC: CPT | Performed by: PHYSICAL THERAPIST

## 2022-02-28 NOTE — PROGRESS NOTES
Daily Note     Today's date: 2022  Patient name: Alford Schilder  : 1965  MRN: [id number]  Referring provider: Joanne Villagomez PT  Dx:   Encounter Diagnosis     ICD-10-CM    1  BPPV (benign paroxysmal positional vertigo), right  H81 11    2  Vertigo  R42        Start Time: 70  Stop Time: 9268  Total time in clinic (min): 30 minutes    Subjective: Pt reports he is not as dizzy getting OOB to the right  Objective: See treatment diary below      Assessment: Pt initially symptomatic with [facility] but without visible nystagmus  Repeated 3x with no symptoms on 3rd repetition  Plan: Continue prn       Precautions: standard      Procedure          R Epley 3x Rx1  Lx1 Lx3  Rx1 Rx3         Manual             cervcial SNAG sitting  5x rot           CTJ mob  performed                        Pt ed    15'                                                                                       Ther Ex                                                                                                                     Ther Activity                                       Gait Training                                       Modalities

## 2022-05-09 NOTE — PROGRESS NOTES
Pt is feeling better, some residual symptoms with quick movements  Able to  softball and perform all activities without limitation   Discharge PT

## 2022-08-02 ENCOUNTER — EVALUATION (OUTPATIENT)
Dept: PHYSICAL THERAPY | Age: 57
End: 2022-08-02
Payer: COMMERCIAL

## 2022-08-02 DIAGNOSIS — R42 DIZZINESS: Primary | ICD-10-CM

## 2022-08-02 DIAGNOSIS — H81.13 BENIGN PAROXYSMAL VERTIGO OF BOTH EARS: ICD-10-CM

## 2022-08-02 PROCEDURE — 97110 THERAPEUTIC EXERCISES: CPT | Performed by: PHYSICAL THERAPIST

## 2022-08-02 PROCEDURE — 97162 PT EVAL MOD COMPLEX 30 MIN: CPT | Performed by: PHYSICAL THERAPIST

## 2022-08-02 PROCEDURE — 95992 CANALITH REPOSITIONING PROC: CPT | Performed by: PHYSICAL THERAPIST

## 2022-08-02 NOTE — LETTER
August 3, 2022    [address]  [facility]    Patient: Meño Tinajero   YOB: 1965   Date of Visit: 2022     Encounter Diagnosis     ICD-10-CM    1  Dizziness  R42    2  Benign paroxysmal vertigo of both ears  H81 13        Dear Dr Letha Arroyo:    Thank you for your recent referral of Meño Tinajero  Please review the attached evaluation summary from Jay's recent visit  Please verify that you agree with the plan of care by signing the attached order  If you have any questions or concerns, please do not hesitate to call  I sincerely appreciate the opportunity to share in the care of one of your patients and hope to have another opportunity to work with you in the near future  Sincerely,    Evelyn Rao, PT      Referring Provider:      I certify that I have read the below Plan of Care and certify the need for these services furnished under this plan of treatment while under my care  Zeyad Barros MD  [address] [place] [address]  Via Fax: [phone]          PT Evaluation     Today's date: 2022  Patient name: Meño Tinajero  : 1965  MRN: [id number]  Referring provider: Tony Reynaga MD  Dx:   Encounter Diagnosis     ICD-10-CM    1  Dizziness  R42    2  Benign paroxysmal vertigo of both ears  H81 13        Start Time: 1400  Stop Time: 1500  Total time in clinic (min): 60 minutes    Assessment  Assessment details: Meño Tinajero is a 65 y o  male who presents with pain, decreased strength, decreased ROM and dizziness  Due to these impairments, Patient has difficulty performing a/iadls and recreational activities  Patient's clinical presentation is consistent with their referring diagnosis of dizziness and PT diagnosis of neck pain  Patient was not symptomatic today   He presented with a negative Marjo Kaela on the right, and bilateral roll test  He had increased swelling on left c/s paraspinal and tight with tenderness in left trap  Patient was instructed in sleeping techniques/positions to help with sinus congestion  Patient would benefit from skilled physical therapy to address their aforementioned impairments, improve their level of function and to improve their overall quality of life  Impairments: abnormal or restricted ROM, activity intolerance, impaired physical strength, lacks appropriate home exercise program, pain with function, poor posture  and poor body mechanics  Other impairment: dizziness    Symptom irritability: moderateUnderstanding of Dx/Px/POC: good   Prognosis: good    Goals  STG 1-2 weeks  1  Decrease c/o dizziness/vertigo by > 50%  2  Independent transfers with ease from supine-sit  3  Decrease neck pain < 4/10 at its worst   LTG 2-4weeks  1  Resolve symptoms of vertigo  2  Independent HEP for home Epley maneuver/self management  3  DHI<10%  4  Pt will be able to perform all adls/iadls/transfers/mobility as prior  Plan  Patient would benefit from: skilled physical therapy  Planned modality interventions: cryotherapy and thermotherapy: hydrocollator packs  Planned therapy interventions: activity modification, behavior modification, flexibility, functional ROM exercises, home exercise program, IADL retraining, joint mobilization, manual therapy, neuromuscular re-education, patient education, postural training, strengthening, stretching, therapeutic activities, therapeutic exercise, canalith repositioning and body mechanics training  Frequency: 2-3x week  Duration in weeks: 12  Treatment plan discussed with: patient        Subjective Evaluation    History of Present Illness  Mechanism of injury: Patient reports the past 3-4 weeks started with dizziness in the am  Patient also c/o fatigue and tired  Feeling off balance and c/o headaches  He has been taking Excedrin and meclizine and feels better   Today he woke up feeling good but had doctor appt and was referred to PT  He also states his right side of neck is painful  Recurrent probem    Pain  Current pain ratin  At worst pain ratin  Location: neck pain  Relieving factors: medications  Progression: no change    Social Support  Lives with: spouse    Employment status: working (teacher)    Diagnostic Tests  No diagnostic tests performed    FCE comments: MRI from 2021 patient stated showed bulging disc in c/sTreatments  Previous treatment: physical therapy  Patient Goals  Patient goals for therapy: decreased pain, improved balance, increased motion, increased strength and independence with ADLs/IADLs  Patient goal: no dizziness        Objective     Concurrent Complaints  Negative for nausea/motion sickness, tinnitus, visual change, hearing loss, memory loss, aural fullness, poor concentration and peripheral neuropathy    Additional Special Questions  Hx of stigmatism  Hx of bulging disc in c/s per patient    Static Posture     Comments  Increased girth left c/s paraspinal compared to right  Postural Observations  Seated posture: good  Standing posture: good        Palpation   Left   Hypertonic in the cervical paraspinals, suboccipitals and upper trapezius  Muscle spasm in the upper trapezius  Tenderness of the upper trapezius  Right   Hypertonic in the upper trapezius  Neurological Testing     Sensation   Cervical/Thoracic   Left   Intact: light touch    Right   Intact: light touch    Active Range of Motion   Cervical/Thoracic Spine       Cervical    Flexion:  WFL  Left rotation: 70 degrees  Right rotation: 60 degrees    with pain  Neuro Exam:     Dizziness  Positive for disequilibrium, vertigo, rocking or swaying and floating or swimming  Negative for oscillopsia, motion sickness, light-headedness and diplopia  Exacerbating factors  Positive for bending over, rolling in bed, turning head, supine to/from sitting and optokinetic movement     Negative for walking and walking in busy environment       Symptoms   Duration: seconds   Frequency: 1x per day upon waking    Cervical exam   Modified VBI   Left: asymptomatic  Right: asymptomatic    Oculomotor exam   Oculomotor ROM: WNL and symtomatic looking down  Resting nystagmus: not present   Gaze holding nystagmus: not present left  and not present right  Smooth pursuits: within normal limits  Vertical saccades: normal  Horizontal saccades: normal  Convergence: normal    Positional testing   [place]-Hallpike   Right posterior canal: WNL  Roll test   Left horizontal canal: WNL  Right horizontal canal: WNL      Flowsheet Rows    Flowsheet Row Most Recent Value   PT/OT G-Codes    Current Score 55  [dizzy]   Projected Score 0   Assessment Type Evaluation             Precautions: HTN, back sx, B knee sx, R shoulder sx, gastric bypass, neck pain        Manuals 8/1            R Epley 1x                                                   Neuro Re-Ed                                                                                                        Ther Ex             HEP 10                                                                                                       Ther Activity                                       Gait Training                                       Modalities

## 2022-08-02 NOTE — PROGRESS NOTES
PT Evaluation     Today's date: 2022  Patient name: Jean-Pierre Calderón  : 1965  MRN: [id number]  Referring provider: Adama Brewer MD  Dx:   Encounter Diagnosis     ICD-10-CM    1  Dizziness  R42    2  Benign paroxysmal vertigo of both ears  H81 13        Start Time: 1400  Stop Time: 1500  Total time in clinic (min): 60 minutes    Assessment  Assessment details: Jean-Pierre Calderón is a 65 y o  male who presents with pain, decreased strength, decreased ROM and dizziness  Due to these impairments, Patient has difficulty performing a/iadls and recreational activities  Patient's clinical presentation is consistent with their referring diagnosis of dizziness and PT diagnosis of neck pain  Patient was not symptomatic today  He presented with a negative Whitaker Heather on the right, and bilateral roll test  He had increased swelling on left c/s paraspinal and tight with tenderness in left trap  Patient was instructed in sleeping techniques/positions to help with sinus congestion  Patient would benefit from skilled physical therapy to address their aforementioned impairments, improve their level of function and to improve their overall quality of life  Impairments: abnormal or restricted ROM, activity intolerance, impaired physical strength, lacks appropriate home exercise program, pain with function, poor posture  and poor body mechanics  Other impairment: dizziness    Symptom irritability: moderateUnderstanding of Dx/Px/POC: good   Prognosis: good    Goals  STG 1-2 weeks  1  Decrease c/o dizziness/vertigo by > 50%  2  Independent transfers with ease from supine-sit  3  Decrease neck pain < 4/10 at its worst   LTG 2-4weeks  1  Resolve symptoms of vertigo  2  Independent HEP for home Epley maneuver/self management  3  DHI<10%  4  Pt will be able to perform all adls/iadls/transfers/mobility as prior      Plan  Patient would benefit from: skilled physical therapy  Planned modality interventions: cryotherapy and thermotherapy: hydrocollator packs  Planned therapy interventions: activity modification, behavior modification, flexibility, functional ROM exercises, home exercise program, IADL retraining, joint mobilization, manual therapy, neuromuscular re-education, patient education, postural training, strengthening, stretching, therapeutic activities, therapeutic exercise, canalith repositioning and body mechanics training  Frequency: 2-3x week  Duration in weeks: 12  Treatment plan discussed with: patient        Subjective Evaluation    History of Present Illness  Mechanism of injury: Patient reports the past 3-4 weeks started with dizziness in the am  Patient also c/o fatigue and tired  Feeling off balance and c/o headaches  He has been taking Excedrin and meclizine and feels better  Today he woke up feeling good but had doctor appt and was referred to PT  He also states his right side of neck is painful  Recurrent probem    Pain  Current pain ratin  At worst pain ratin  Location: neck pain  Relieving factors: medications  Progression: no change    Social Support  Lives with: spouse    Employment status: working (teacher)    Diagnostic Tests  No diagnostic tests performed    FCE comments: MRI from 2021 patient stated showed bulging disc in c/sTreatments  Previous treatment: physical therapy  Patient Goals  Patient goals for therapy: decreased pain, improved balance, increased motion, increased strength and independence with ADLs/IADLs  Patient goal: no dizziness        Objective     Concurrent Complaints  Negative for nausea/motion sickness, tinnitus, visual change, hearing loss, memory loss, aural fullness, poor concentration and peripheral neuropathy    Additional Special Questions  Hx of stigmatism  Hx of bulging disc in c/s per patient    Static Posture     Comments  Increased girth left c/s paraspinal compared to right       Postural Observations  Seated posture: good  Standing posture: good        Palpation   Left   Hypertonic in the cervical paraspinals, suboccipitals and upper trapezius  Muscle spasm in the upper trapezius  Tenderness of the upper trapezius  Right   Hypertonic in the upper trapezius  Neurological Testing     Sensation   Cervical/Thoracic   Left   Intact: light touch    Right   Intact: light touch    Active Range of Motion   Cervical/Thoracic Spine       Cervical    Flexion:  WFL  Left rotation: 70 degrees  Right rotation: 60 degrees    with pain  Neuro Exam:     Dizziness  Positive for disequilibrium, vertigo, rocking or swaying and floating or swimming  Negative for oscillopsia, motion sickness, light-headedness and diplopia  Exacerbating factors  Positive for bending over, rolling in bed, turning head, supine to/from sitting and optokinetic movement  Negative for walking and walking in busy environment       Symptoms   Duration: seconds   Frequency: 1x per day upon waking    Cervical exam   Modified VBI   Left: asymptomatic  Right: asymptomatic    Oculomotor exam   Oculomotor ROM: WNL and symtomatic looking down  Resting nystagmus: not present   Gaze holding nystagmus: not present left  and not present right  Smooth pursuits: within normal limits  Vertical saccades: normal  Horizontal saccades: normal  Convergence: normal    Positional testing   [place]-Hallpike   Right posterior canal: WNL  Roll test   Left horizontal canal: WNL  Right horizontal canal: WNL      Flowsheet Rows    Flowsheet Row Most Recent Value   PT/OT G-Codes    Current Score 55  [dizzy]   Projected Score 0   Assessment Type Evaluation             Precautions: HTN, back sx, B knee sx, R shoulder sx, gastric bypass, neck pain        Manuals 8/1            R Epley 1x                                                   Neuro Re-Ed                                                                                                        Ther Ex             HEP 10 Ther Activity                                       Gait Training                                       Modalities

## 2022-09-01 NOTE — PROGRESS NOTES
Patient has not returned for further PT  He is doing well and no longer dizzy after first session  D/c PT at this time
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
## 2024-05-08 ENCOUNTER — HOSPITAL ENCOUNTER (OUTPATIENT)
Facility: HOSPITAL | Age: 59
Setting detail: OBSERVATION
Discharge: HOME | End: 2024-05-09
Attending: EMERGENCY MEDICINE | Admitting: INTERNAL MEDICINE
Payer: COMMERCIAL

## 2024-05-08 ENCOUNTER — APPOINTMENT (OUTPATIENT)
Dept: MRI IMAGING | Facility: HOSPITAL | Age: 59
End: 2024-05-08
Payer: COMMERCIAL

## 2024-05-08 ENCOUNTER — APPOINTMENT (EMERGENCY)
Dept: CT IMAGING | Facility: HOSPITAL | Age: 59
End: 2024-05-08
Payer: COMMERCIAL

## 2024-05-08 DIAGNOSIS — T78.40XA ALLERGY, INITIAL ENCOUNTER: ICD-10-CM

## 2024-05-08 DIAGNOSIS — R29.90 STROKE-LIKE SYMPTOMS: ICD-10-CM

## 2024-05-08 DIAGNOSIS — G45.9 TIA (TRANSIENT ISCHEMIC ATTACK): Primary | ICD-10-CM

## 2024-05-08 PROBLEM — I10 HTN (HYPERTENSION): Status: ACTIVE | Noted: 2024-05-08

## 2024-05-08 LAB
ANION GAP SERPL CALCULATED.3IONS-SCNC: 5 MMOL/L (ref 4–13)
ATRIAL RATE: 76 BPM
BASOPHILS # BLD AUTO: 0.05 THOUSANDS/ÂΜL (ref 0–0.1)
BASOPHILS NFR BLD AUTO: 1 % (ref 0–1)
BUN SERPL-MCNC: 12 MG/DL (ref 5–25)
CALCIUM SERPL-MCNC: 9.4 MG/DL (ref 8.4–10.2)
CHLORIDE SERPL-SCNC: 105 MMOL/L (ref 96–108)
CO2 SERPL-SCNC: 28 MMOL/L (ref 21–32)
CREAT SERPL-MCNC: 0.88 MG/DL (ref 0.6–1.3)
EOSINOPHIL # BLD AUTO: 0.06 THOUSAND/ÂΜL (ref 0–0.61)
EOSINOPHIL NFR BLD AUTO: 1 % (ref 0–6)
ERYTHROCYTE [DISTWIDTH] IN BLOOD BY AUTOMATED COUNT: 13.9 % (ref 11.6–15.1)
GFR SERPL CREATININE-BSD FRML MDRD: 94 ML/MIN/1.73SQ M
GLUCOSE SERPL-MCNC: 111 MG/DL (ref 65–140)
GLUCOSE SERPL-MCNC: 116 MG/DL (ref 65–140)
HCT VFR BLD AUTO: 44.2 % (ref 36.5–49.3)
HGB BLD-MCNC: 14.6 G/DL (ref 12–17)
IMM GRANULOCYTES # BLD AUTO: 0.03 THOUSAND/UL (ref 0–0.2)
IMM GRANULOCYTES NFR BLD AUTO: 0 % (ref 0–2)
LYMPHOCYTES # BLD AUTO: 0.82 THOUSANDS/ÂΜL (ref 0.6–4.47)
LYMPHOCYTES NFR BLD AUTO: 12 % (ref 14–44)
MCH RBC QN AUTO: 29 PG (ref 26.8–34.3)
MCHC RBC AUTO-ENTMCNC: 33 G/DL (ref 31.4–37.4)
MCV RBC AUTO: 88 FL (ref 82–98)
MONOCYTES # BLD AUTO: 0.46 THOUSAND/ÂΜL (ref 0.17–1.22)
MONOCYTES NFR BLD AUTO: 7 % (ref 4–12)
NEUTROPHILS # BLD AUTO: 5.56 THOUSANDS/ÂΜL (ref 1.85–7.62)
NEUTS SEG NFR BLD AUTO: 79 % (ref 43–75)
NRBC BLD AUTO-RTO: 0 /100 WBCS
P AXIS: 49 DEGREES
PLATELET # BLD AUTO: 172 THOUSANDS/UL (ref 149–390)
PMV BLD AUTO: 10.8 FL (ref 8.9–12.7)
POTASSIUM SERPL-SCNC: 4.9 MMOL/L (ref 3.5–5.3)
PR INTERVAL: 122 MS
QRS AXIS: 66 DEGREES
QRSD INTERVAL: 80 MS
QT INTERVAL: 376 MS
QTC INTERVAL: 423 MS
RBC # BLD AUTO: 5.04 MILLION/UL (ref 3.88–5.62)
SODIUM SERPL-SCNC: 138 MMOL/L (ref 135–147)
T WAVE AXIS: 21 DEGREES
VENTRICULAR RATE: 76 BPM
WBC # BLD AUTO: 6.98 THOUSAND/UL (ref 4.31–10.16)

## 2024-05-08 PROCEDURE — 36415 COLL VENOUS BLD VENIPUNCTURE: CPT | Performed by: EMERGENCY MEDICINE

## 2024-05-08 PROCEDURE — 93010 ELECTROCARDIOGRAM REPORT: CPT | Performed by: INTERNAL MEDICINE

## 2024-05-08 PROCEDURE — 93005 ELECTROCARDIOGRAM TRACING: CPT

## 2024-05-08 PROCEDURE — 85025 COMPLETE CBC W/AUTO DIFF WBC: CPT | Performed by: EMERGENCY MEDICINE

## 2024-05-08 PROCEDURE — 99285 EMERGENCY DEPT VISIT HI MDM: CPT

## 2024-05-08 PROCEDURE — 70496 CT ANGIOGRAPHY HEAD: CPT

## 2024-05-08 PROCEDURE — 80061 LIPID PANEL: CPT

## 2024-05-08 PROCEDURE — 70551 MRI BRAIN STEM W/O DYE: CPT

## 2024-05-08 PROCEDURE — 99285 EMERGENCY DEPT VISIT HI MDM: CPT | Performed by: EMERGENCY MEDICINE

## 2024-05-08 PROCEDURE — 82948 REAGENT STRIP/BLOOD GLUCOSE: CPT

## 2024-05-08 PROCEDURE — 70498 CT ANGIOGRAPHY NECK: CPT

## 2024-05-08 PROCEDURE — 83036 HEMOGLOBIN GLYCOSYLATED A1C: CPT

## 2024-05-08 PROCEDURE — 99223 1ST HOSP IP/OBS HIGH 75: CPT | Performed by: INTERNAL MEDICINE

## 2024-05-08 PROCEDURE — 80048 BASIC METABOLIC PNL TOTAL CA: CPT | Performed by: EMERGENCY MEDICINE

## 2024-05-08 RX ORDER — ASPIRIN 81 MG/1
81 TABLET, CHEWABLE ORAL DAILY
Status: DISCONTINUED | OUTPATIENT
Start: 2024-05-09 | End: 2024-05-09 | Stop reason: HOSPADM

## 2024-05-08 RX ORDER — ACETAMINOPHEN 325 MG/1
650 TABLET ORAL EVERY 6 HOURS PRN
Status: DISCONTINUED | OUTPATIENT
Start: 2024-05-08 | End: 2024-05-08

## 2024-05-08 RX ORDER — ACETAMINOPHEN 10 MG/ML
1000 INJECTION, SOLUTION INTRAVENOUS ONCE
Status: COMPLETED | OUTPATIENT
Start: 2024-05-08 | End: 2024-05-08

## 2024-05-08 RX ORDER — ACETAMINOPHEN 325 MG/1
650 TABLET ORAL EVERY 6 HOURS PRN
Status: DISCONTINUED | OUTPATIENT
Start: 2024-05-08 | End: 2024-05-09

## 2024-05-08 RX ORDER — ACETAMINOPHEN 325 MG/1
975 TABLET ORAL EVERY 8 HOURS SCHEDULED
Status: DISCONTINUED | OUTPATIENT
Start: 2024-05-08 | End: 2024-05-09 | Stop reason: HOSPADM

## 2024-05-08 RX ORDER — ASPIRIN 325 MG
325 TABLET ORAL ONCE
Status: COMPLETED | OUTPATIENT
Start: 2024-05-08 | End: 2024-05-08

## 2024-05-08 RX ORDER — ATORVASTATIN CALCIUM 40 MG/1
40 TABLET, FILM COATED ORAL EVERY EVENING
Status: DISCONTINUED | OUTPATIENT
Start: 2024-05-08 | End: 2024-05-09 | Stop reason: HOSPADM

## 2024-05-08 RX ADMIN — ACETAMINOPHEN 650 MG: 325 TABLET ORAL at 21:33

## 2024-05-08 RX ADMIN — ACETAMINOPHEN 1000 MG: 10 INJECTION INTRAVENOUS at 16:11

## 2024-05-08 RX ADMIN — ASPIRIN 325 MG ORAL TABLET 325 MG: 325 PILL ORAL at 17:31

## 2024-05-08 RX ADMIN — IOHEXOL 85 ML: 350 INJECTION, SOLUTION INTRAVENOUS at 14:21

## 2024-05-08 RX ADMIN — ATORVASTATIN CALCIUM 40 MG: 40 TABLET, FILM COATED ORAL at 17:31

## 2024-05-08 NOTE — ASSESSMENT & PLAN NOTE
POA with difficulty recalling things and confusion while patient was at work.  Episode of difficulty recalling things starting at approximately 9 am.  NIHSS score 0, per ED. No focal neurological deficits noted on this examination.  CTA head and neck with no intracranial abnormality. Major vessels are patent without high-grade stenosis.  No aneurysm.  No significant stenosis in the cervical carotid or vertebral arteries.  Etiology likely TIA versus hypertensive urgency.    Plan:   On stroke pathway, Neurology consulted.  BP goals < 220/110 for 24 hours.   Maintain glucose <180, SSI for coverage if needed   Atorvastatin 40 mg qd  Aspirin 325 mg loading dose then 81 qd  Follow up on lipid panel and A1c  DVT prophylaxis SCDs  Serial neuro checks: Stat CT head for any acute change in neuro status.   Monitor on Telemetry

## 2024-05-08 NOTE — H&P
[facility]  H&P  Name: Jay Smith 59 y.o. male I MRN: [id number]  Unit/Bed#: ED-36 I Date of Admission: 5/8/2024   Date of Service: 5/8/2024 I Hospital Day: 0      Assessment/Plan   * Stroke-like symptoms  Assessment & Plan  POA with difficulty recalling things and confusion while patient was at work.  Episode of difficulty recalling things starting at approximately 9 am.  NIHSS score 0, per ED. No focal neurological deficits noted on this examination.  CTA head and neck with no intracranial abnormality. Major vessels are patent without high-grade stenosis.  No aneurysm.  No significant stenosis in the cervical carotid or vertebral arteries.  Etiology likely TIA versus hypertensive urgency.    Plan:   On stroke pathway, Neurology consulted.  BP goals < 220/110 for 24 hours.   Maintain glucose <180, SSI for coverage if needed   Atorvastatin 40 mg qd  Aspirin 325 mg loading dose then 81 qd  Follow up on lipid panel and A1c  DVT prophylaxis SCDs  Serial neuro checks: Stat CT head for any acute change in neuro status.   Monitor on Telemetry    HTN (hypertension)  Assessment & Plan  History of hypertension, reports resolved after gastric bypass surgery in 2015.  Patient currently not on any antihypertensive.  On stroke pathway, will hold off on initiating antihypertensive at the moment.  Blood pressure goal <220/110 for 24 hrs.            VTE Pharmacologic Prophylaxis: VTE Score: 2 Low Risk (Score 0-2) - Encourage Ambulation.  Code Status: Level 1 - Full Code   Discussion with family: Patient declined call to .     Anticipated Length of Stay: Patient will be admitted on an observation basis with an anticipated length of stay of less than 2 midnights secondary to stroke rule out.    Chief Complaint: Confusion, Difficulty recalling things    History of Present Illness:  Jay Smith is a 59 y.o. male with a PMH of HTN who presents with difficulty recalling things and  confusion while he was at work that last few hours. School nurse reported that he had left eyelid droop, however he is unsure or denies of this.  He did not have any difficulty with speech, weakness of his extremities or gait difficulties. He is not on any medications for hypertension, reports that resolved since his gastric bypass surgery in 2015.  He denies any recreational drug use or smoking. He does drink alcohol occasionally.    On arrival to the ED he was hypertensive, /102.  However, he was back to baseline except for feeling a little dazed so stroke alert was not called.  Lab work including BMP, CBC unremarkable.  CTA head and neck with no acute intracranial abnormality.  Major vessels are patent without high-grade stenosis.  No aneurysm.  No significant stenosis in the cervical carotid or vertebral arteries.  Patient is admitted for MRI as well as neuro eval.    Review of Systems:  Review of Systems   Constitutional:  Negative for chills and fever.   HENT:  Negative for congestion and sore throat.    Respiratory:  Negative for cough and shortness of breath.    Cardiovascular:  Negative for chest pain and palpitations.   Gastrointestinal:  Negative for abdominal pain, nausea and vomiting.   Genitourinary:  Negative for dysuria.   Musculoskeletal:  Negative for arthralgias.   Neurological:  Negative for dizziness and headaches.       Past Medical and Surgical History:   Past Medical History:   Diagnosis Date    Hypertension        Past Surgical History:   Procedure Laterality Date    BACK SURGERY      GASTRIC BYPASS      KNEE SURGERY      SHOULDER SURGERY         Meds/Allergies:  Prior to Admission medications    Medication Sig Start Date End Date Taking? Authorizing Provider   acetaminophen (TYLENOL) 325 mg tablet Take 3 tablets (975 mg total) by mouth every 8 (eight) hours  Patient not taking: Reported on 7/1/2021 4/9/19   FATUMA Oakes   gabapentin (NEURONTIN) 100 mg capsule Take 1 capsule  (100 mg total) by mouth 3 (three) times a day  Patient not taking: Reported on 5/14/2019 4/9/19   FATUMA Oakes   meclizine (ANTIVERT) 25 mg tablet Take 1 tablet (25 mg total) by mouth every 8 (eight) hours as needed for dizziness  Patient not taking: Reported on 7/1/2021 4/9/19   FATUMA Oakes   methocarbamol (ROBAXIN) 750 mg tablet Take 1 tablet (750 mg total) by mouth every 6 (six) hours  Patient not taking: Reported on 7/1/2021 4/9/19   FATUMA Oakes   methylPREDNISolone 4 MG tablet therapy pack Use as directed on package  Patient not taking: Reported on 4/23/2019 4/9/19   FATUMA Oakes   methylPREDNISolone 4 MG tablet therapy pack Use as directed on package  Patient not taking: Reported on 4/23/2019 4/9/19   FATUMA Oakes   ondansetron (ZOFRAN) 4 mg tablet Take 1 tablet (4 mg total) by mouth every 8 (eight) hours as needed for nausea or vomiting  Patient not taking: Reported on 5/14/2019 4/9/19   FATUMA Oakes   senna (SENOKOT) 8.6 mg Take 2 tablets (17.2 mg total) by mouth daily  Patient not taking: Reported on 4/23/2019 4/10/19   FATUMA Oakes     I have reviewed home medications with patient personally.    Allergies: No Known Allergies    Social History:  Marital Status: /Civil Union   Occupation:   Patient Pre-hospital Living Situation: Home  Patient Pre-hospital Level of Mobility: walks  Patient Pre-hospital Diet Restrictions: None  Substance Use History:   Social History     Substance and Sexual Activity   Alcohol Use Not Currently     Social History     Tobacco Use   Smoking Status Never   Smokeless Tobacco Never     Social History     Substance and Sexual Activity   Drug Use Never       Family History:  Family History   Problem Relation Age of Onset    Heart attack Father     Diabetes Father        Physical Exam:     Vitals:   Blood Pressure: 164/95 (05/08/24 1700)  Pulse: 70 (05/08/24 1700)  Temperature: 98 °F (36.7 °C) (05/08/24 1700)  Temp  Source: Oral (05/08/24 1700)  Respirations: 18 (05/08/24 1700)  Weight - Scale: 100 kg (221 lb 5.5 oz) (05/08/24 1303)  SpO2: 95 % (05/08/24 1700)    Physical Exam  Vitals and nursing note reviewed.   Constitutional:       General: He is not in acute distress.     Appearance: Normal appearance. He is not ill-appearing.   HENT:      Head: Normocephalic and atraumatic.   Eyes:      General: No visual field deficit.     Extraocular Movements: Extraocular movements intact.      Conjunctiva/sclera: Conjunctivae normal.      Pupils: Pupils are equal, round, and reactive to light.   Cardiovascular:      Rate and Rhythm: Normal rate and regular rhythm.      Pulses: Normal pulses.   Pulmonary:      Effort: Pulmonary effort is normal.      Breath sounds: Normal breath sounds.   Abdominal:      General: Bowel sounds are normal.      Palpations: Abdomen is soft.   Skin:     General: Skin is warm and dry.      Capillary Refill: Capillary refill takes less than 2 seconds.   Neurological:      Mental Status: He is alert and oriented to person, place, and time. Mental status is at baseline.      GCS: GCS eye subscore is 4. GCS verbal subscore is 5. GCS motor subscore is 6.      Cranial Nerves: Cranial nerves 2-12 are intact. No dysarthria or facial asymmetry.      Sensory: Sensation is intact.      Motor: Motor function is intact. No weakness or pronator drift.      Coordination: Coordination is intact. Finger-Nose-Finger Test and Heel to Shin Test normal.          Additional Data:     Lab Results:  Results from last 7 days   Lab Units 05/08/24  1318   WBC Thousand/uL 6.98   HEMOGLOBIN g/dL 14.6   HEMATOCRIT % 44.2   PLATELETS Thousands/uL 172   SEGS PCT % 79*   LYMPHO PCT % 12*   MONO PCT % 7   EOS PCT % 1     Results from last 7 days   Lab Units 05/08/24  1318   SODIUM mmol/L 138   POTASSIUM mmol/L 4.9   CHLORIDE mmol/L 105   CO2 mmol/L 28   BUN mg/dL 12   CREATININE mg/dL 0.88   ANION GAP mmol/L 5   CALCIUM mg/dL 9.4   GLUCOSE  RANDOM mg/dL 116         Results from last 7 days   Lab Units 05/08/24  1303   POC GLUCOSE mg/dl 111               Lines/Drains:  Invasive Devices       Peripheral Intravenous Line  Duration             Peripheral IV 05/08/24 Left Antecubital <1 day                        Imaging: Reviewed radiology reports from this admission including: CT head  CTA head and neck with and without contrast   Final Result by Lopez Corona MD (05/08 8967)      1.  No acute intracranial CT abnormality.   2.  Patent major vessels of the [place] of hdz without high-grade stenosis.  No aneurysm.   3.  No hemodynamically significant stenosis in the cervical carotid and vertebral arteries.                  Workstation performed: [id number]         MRI Inpatient Order    (Results Pending)       EKG and Other Studies Reviewed on Admission:   EKG:  Reviewed .    ** Please Note: This note has been constructed using a voice recognition system. **

## 2024-05-08 NOTE — ASSESSMENT & PLAN NOTE
History of hypertension, reports resolved after gastric bypass surgery in 2015.  Patient currently not on any antihypertensive.  On stroke pathway, will hold off on initiating antihypertensive at the moment.  Blood pressure goal <220/110 for 24 hrs.

## 2024-05-08 NOTE — ED PROVIDER NOTES
"History  Chief Complaint   Patient presents with    CVA/TIA-like Symptoms     Pt presents from work where school nurse stated his left side was weak and \"left eye was droopy\" Presents from  with no weakness and neurologically intact. States he feels he is in a daze.        History provided by:  Patient   used: No    CVA/TIA-like Symptoms  Presenting symptoms: no headaches and no weakness    Associated symptoms: no chest pain, no dizziness, no fever, no nausea, no neck pain and no vomiting      59-year-old male presenting to emergency department after school nurse noted left-sided weakness and left eye droopy.  Patient states that he was feeling these throughout the day, difficulty using his phone or remembering things.  His symptoms have resolved.  He has no deficit this time. NIH Stroke scale of 0.  No headache.  No chest pain.  No back pain.  No fevers or chills.  No nausea or vomiting.  No abdominal pain.  No history of the same.  Denies drugs or smoking.  Denies alcohol use.      Prior to Admission Medications   Prescriptions Last Dose Informant Patient Reported? Taking?   acetaminophen (TYLENOL) 325 mg tablet   No No   Sig: Take 3 tablets (975 mg total) by mouth every 8 (eight) hours   Patient not taking: Reported on 7/1/2021   gabapentin (NEURONTIN) 100 mg capsule   No No   Sig: Take 1 capsule (100 mg total) by mouth 3 (three) times a day   Patient not taking: Reported on 5/14/2019   meclizine (ANTIVERT) 25 mg tablet   No No   Sig: Take 1 tablet (25 mg total) by mouth every 8 (eight) hours as needed for dizziness   Patient not taking: Reported on 7/1/2021   methocarbamol (ROBAXIN) 750 mg tablet   No No   Sig: Take 1 tablet (750 mg total) by mouth every 6 (six) hours   Patient not taking: Reported on 7/1/2021   methylPREDNISolone 4 MG tablet therapy pack   No No   Sig: Use as directed on package   Patient not taking: Reported on 4/23/2019   methylPREDNISolone 4 MG tablet therapy pack   No " No   Sig: Use as directed on package   Patient not taking: Reported on 4/23/2019   ondansetron (ZOFRAN) 4 mg tablet   No No   Sig: Take 1 tablet (4 mg total) by mouth every 8 (eight) hours as needed for nausea or vomiting   Patient not taking: Reported on 5/14/2019   senna (SENOKOT) 8.6 mg   No No   Sig: Take 2 tablets (17.2 mg total) by mouth daily   Patient not taking: Reported on 4/23/2019      Facility-Administered Medications: None       Past Medical History:   Diagnosis Date    Hypertension        Past Surgical History:   Procedure Laterality Date    BACK SURGERY      GASTRIC BYPASS      KNEE SURGERY      SHOULDER SURGERY         Family History   Problem Relation Age of Onset    Heart attack Father     Diabetes Father      I have reviewed and agree with the history as documented.    E-Cigarette/Vaping    E-Cigarette Use Never User      E-Cigarette/Vaping Substances     Social History     Tobacco Use    Smoking status: Never    Smokeless tobacco: Never   Vaping Use    Vaping status: Never Used   Substance Use Topics    Alcohol use: Not Currently    Drug use: Never       Review of Systems   Constitutional:  Negative for chills, diaphoresis and fever.   HENT:  Negative for congestion and sore throat.    Respiratory:  Negative for cough, shortness of breath, wheezing and stridor.    Cardiovascular:  Negative for chest pain, palpitations and leg swelling.   Gastrointestinal:  Negative for abdominal pain, blood in stool, diarrhea, nausea and vomiting.   Genitourinary:  Negative for dysuria, frequency and urgency.   Musculoskeletal:  Negative for neck pain and neck stiffness.   Skin:  Negative for pallor and rash.   Neurological:  Negative for dizziness, syncope, weakness, light-headedness and headaches.   All other systems reviewed and are negative.      Physical Exam  Physical Exam  Vitals reviewed.   Constitutional:       Appearance: Normal appearance. He is well-developed.   HENT:      Head: Normocephalic and  atraumatic.   Eyes:      Extraocular Movements: Extraocular movements intact.      Pupils: Pupils are equal, round, and reactive to light.   Cardiovascular:      Rate and Rhythm: Normal rate and regular rhythm.      Heart sounds: Normal heart sounds.   Pulmonary:      Effort: Pulmonary effort is normal. No respiratory distress.      Breath sounds: Normal breath sounds.   Abdominal:      General: Bowel sounds are normal.      Palpations: Abdomen is soft.      Tenderness: There is no abdominal tenderness.   Musculoskeletal:         General: No swelling or tenderness. Normal range of motion.      Cervical back: Normal range of motion and neck supple.   Skin:     General: Skin is warm and dry.      Capillary Refill: Capillary refill takes less than 2 seconds.   Neurological:      General: No focal deficit present.      Mental Status: He is alert and oriented to person, place, and time.      Cranial Nerves: No cranial nerve deficit.      Sensory: No sensory deficit.      Motor: No weakness.      Coordination: Coordination normal.      Gait: Gait normal.         Vital Signs  ED Triage Vitals [05/08/24 1305]   Temperature Pulse Respirations Blood Pressure SpO2   98.2 °F (36.8 °C) 83 18 (!) 184/102 96 %      Temp Source Heart Rate Source Patient Position - Orthostatic VS BP Location FiO2 (%)   Oral Monitor Sitting Right arm --      Pain Score       No Pain           Vitals:    05/08/24 1508 05/08/24 1530 05/08/24 1600 05/08/24 1700   BP: 164/90 (!) 171/101 (!) 169/106 164/95   Pulse: 70 65 66 70   Patient Position - Orthostatic VS: Lying  Lying          Visual Acuity      ED Medications  Medications   iohexol (OMNIPAQUE) 350 MG/ML injection (SINGLE-DOSE) 85 mL (85 mL Intravenous Given 5/8/24 1421)   acetaminophen (Ofirmev) injection 1,000 mg (1,000 mg Intravenous New Bag 5/8/24 1611)       Diagnostic Studies  Results Reviewed       Procedure Component Value Units Date/Time    Basic metabolic panel [[id number]] Collected:  05/08/24 1318    Lab Status: Final result Specimen: Blood from Arm, Left Updated: 05/08/24 1350     Sodium 138 mmol/L      Potassium 4.9 mmol/L      Chloride 105 mmol/L      CO2 28 mmol/L      ANION GAP 5 mmol/L      BUN 12 mg/dL      Creatinine 0.88 mg/dL      Glucose 116 mg/dL      Calcium 9.4 mg/dL      eGFR 94 ml/min/1.73sq m     Narrative:      National Kidney Disease Foundation guidelines for Chronic Kidney Disease (CKD):     Stage 1 with normal or high GFR (GFR > 90 mL/min/1.73 square meters)    Stage 2 Mild CKD (GFR = 60-89 mL/min/1.73 square meters)    Stage 3A Moderate CKD (GFR = 45-59 mL/min/1.73 square meters)    Stage 3B Moderate CKD (GFR = 30-44 mL/min/1.73 square meters)    Stage 4 Severe CKD (GFR = 15-29 mL/min/1.73 square meters)    Stage 5 End Stage CKD (GFR <15 mL/min/1.73 square meters)  Note: GFR calculation is accurate only with a steady state creatinine    CBC and differential [[id number]]  (Abnormal) Collected: 05/08/24 1318    Lab Status: Final result Specimen: Blood from Arm, Left Updated: 05/08/24 1330     WBC 6.98 Thousand/uL      RBC 5.04 Million/uL      Hemoglobin 14.6 g/dL      Hematocrit 44.2 %      MCV 88 fL      MCH 29.0 pg      MCHC 33.0 g/dL      RDW 13.9 %      MPV 10.8 fL      Platelets 172 Thousands/uL      nRBC 0 /100 WBCs      Segmented % 79 %      Immature Grans % 0 %      Lymphocytes % 12 %      Monocytes % 7 %      Eosinophils Relative 1 %      Basophils Relative 1 %      Absolute Neutrophils 5.56 Thousands/µL      Absolute Immature Grans 0.03 Thousand/uL      Absolute Lymphocytes 0.82 Thousands/µL      Absolute Monocytes 0.46 Thousand/µL      Eosinophils Absolute 0.06 Thousand/µL      Basophils Absolute 0.05 Thousands/µL     Fingerstick Glucose (POCT) [[id number]]  (Normal) Collected: 05/08/24 1303    Lab Status: Final result Specimen: Blood Updated: 05/08/24 1304     POC Glucose 111 mg/dl                    CTA head and neck with and without contrast   Final Result by  Lopez Corona MD (05/08 0018)      1.  No acute intracranial CT abnormality.   2.  Patent major vessels of the [place] of hdz without high-grade stenosis.  No aneurysm.   3.  No hemodynamically significant stenosis in the cervical carotid and vertebral arteries.                  Workstation performed: [id number]                    Procedures  Procedures         ED Course  ED Course as of 05/08/24 1709   Wed May 08, 2024   1320 ECG shows rate 76, sinus, normal axis, normal QRS, no significant ST or T wave changes, normal intervals, independently interpreted by me                                             Medical Decision Making  Differential diagnosis includes TIA, stroke, electrolyte balances, mass, intracranial bleeding, arrhythmias.  With nih scale of 0 and no deficit this time we will not do stroke alert.  Will do imaging of the brain, check electrolytes, EKG to eval for arrhythmias, likely admission for TIA workup    Amount and/or Complexity of Data Reviewed  Labs: ordered.  Radiology: ordered.    Risk  Prescription drug management.  Decision regarding hospitalization.             Disposition  Final diagnoses:   TIA (transient ischemic attack)     Time reflects when diagnosis was documented in both MDM as applicable and the Disposition within this note       Time User Action Codes Description Comment    5/8/2024  3:57 PM Sindhu Day Add [G45.9] TIA (transient ischemic attack)           ED Disposition       ED Disposition   Admit    Condition   Stable    Date/Time   Wed May 8, 2024  3:57 PM    Comment   Case was discussed with onel and the patient's admission status was agreed to be Admission Status: observation status to the service of Dr. Feldman .               Follow-up Information    None         Patient's Medications   Discharge Prescriptions    No medications on file       No discharge procedures on file.    PDMP Review       None            ED Provider  Electronically Signed by             Sindhu  MD Barb  05/08/24 6685

## 2024-05-09 ENCOUNTER — APPOINTMENT (OUTPATIENT)
Dept: NON INVASIVE DIAGNOSTICS | Facility: HOSPITAL | Age: 59
End: 2024-05-09
Payer: COMMERCIAL

## 2024-05-09 VITALS
RESPIRATION RATE: 18 BRPM | OXYGEN SATURATION: 96 % | HEIGHT: 71 IN | DIASTOLIC BLOOD PRESSURE: 92 MMHG | BODY MASS INDEX: 30.94 KG/M2 | WEIGHT: 221 LBS | HEART RATE: 75 BPM | TEMPERATURE: 98.5 F | SYSTOLIC BLOOD PRESSURE: 156 MMHG

## 2024-05-09 LAB
25(OH)D3 SERPL-MCNC: 25.8 NG/ML (ref 30–100)
ANION GAP SERPL CALCULATED.3IONS-SCNC: 6 MMOL/L (ref 4–13)
AORTIC ROOT: 3.7 CM
APICAL FOUR CHAMBER EJECTION FRACTION: 56 %
ASCENDING AORTA: 3.4 CM
BSA FOR ECHO PROCEDURE: 2.2 M2
BUN SERPL-MCNC: 11 MG/DL (ref 5–25)
CALCIUM SERPL-MCNC: 8.7 MG/DL (ref 8.4–10.2)
CHLORIDE SERPL-SCNC: 107 MMOL/L (ref 96–108)
CHOLEST SERPL-MCNC: 186 MG/DL
CO2 SERPL-SCNC: 26 MMOL/L (ref 21–32)
CREAT SERPL-MCNC: 0.76 MG/DL (ref 0.6–1.3)
E WAVE DECELERATION TIME: 187 MS
E/A RATIO: 0.94
ERYTHROCYTE [DISTWIDTH] IN BLOOD BY AUTOMATED COUNT: 13.5 % (ref 11.6–15.1)
EST. AVERAGE GLUCOSE BLD GHB EST-MCNC: 108 MG/DL
FRACTIONAL SHORTENING: 33 (ref 28–44)
GFR SERPL CREATININE-BSD FRML MDRD: 100 ML/MIN/1.73SQ M
GLUCOSE P FAST SERPL-MCNC: 103 MG/DL (ref 65–99)
GLUCOSE SERPL-MCNC: 103 MG/DL (ref 65–140)
HBA1C MFR BLD: 5.4 %
HCT VFR BLD AUTO: 41.3 % (ref 36.5–49.3)
HDLC SERPL-MCNC: 67 MG/DL
HGB BLD-MCNC: 13.7 G/DL (ref 12–17)
INTERVENTRICULAR SEPTUM IN DIASTOLE (PARASTERNAL SHORT AXIS VIEW): 1.4 CM
INTERVENTRICULAR SEPTUM: 1.4 CM (ref 0.6–1.1)
LAAS-AP2: 17.7 CM2
LAAS-AP4: 13.5 CM2
LDLC SERPL CALC-MCNC: 106 MG/DL (ref 0–100)
LEFT ATRIUM SIZE: 4.1 CM
LEFT ATRIUM VOLUME (MOD BIPLANE): 42 ML
LEFT ATRIUM VOLUME INDEX (MOD BIPLANE): 19.1 ML/M2
LEFT INTERNAL DIMENSION IN SYSTOLE: 3.1 CM (ref 2.1–4)
LEFT VENTRICULAR INTERNAL DIMENSION IN DIASTOLE: 4.6 CM (ref 3.5–6)
LEFT VENTRICULAR POSTERIOR WALL IN END DIASTOLE: 1.2 CM
LEFT VENTRICULAR STROKE VOLUME: 63 ML
LVSV (TEICH): 63 ML
MCH RBC QN AUTO: 28.8 PG (ref 26.8–34.3)
MCHC RBC AUTO-ENTMCNC: 33.2 G/DL (ref 31.4–37.4)
MCV RBC AUTO: 87 FL (ref 82–98)
MV E'TISSUE VEL-LAT: 11 CM/S
MV E'TISSUE VEL-SEP: 7 CM/S
MV PEAK A VEL: 0.69 M/S
MV PEAK E VEL: 65 CM/S
MV STENOSIS PRESSURE HALF TIME: 54 MS
MV VALVE AREA P 1/2 METHOD: 4.07
PLATELET # BLD AUTO: 164 THOUSANDS/UL (ref 149–390)
PMV BLD AUTO: 10.7 FL (ref 8.9–12.7)
POTASSIUM SERPL-SCNC: 3.8 MMOL/L (ref 3.5–5.3)
RBC # BLD AUTO: 4.75 MILLION/UL (ref 3.88–5.62)
RIGHT ATRIUM AREA SYSTOLE A4C: 18.5 CM2
RIGHT VENTRICLE ID DIMENSION: 4.1 CM
SL CV LEFT ATRIUM LENGTH A2C: 4.7 CM
SL CV LV EF: 65
SL CV PED ECHO LEFT VENTRICLE DIASTOLIC VOLUME (MOD BIPLANE) 2D: 100 ML
SL CV PED ECHO LEFT VENTRICLE SYSTOLIC VOLUME (MOD BIPLANE) 2D: 37 ML
SODIUM SERPL-SCNC: 139 MMOL/L (ref 135–147)
TR MAX PG: 19 MMHG
TR PEAK VELOCITY: 2.2 M/S
TRICUSPID ANNULAR PLANE SYSTOLIC EXCURSION: 2.3 CM
TRICUSPID VALVE PEAK REGURGITATION VELOCITY: 2.15 M/S
TRIGL SERPL-MCNC: 65 MG/DL
TSH SERPL DL<=0.05 MIU/L-ACNC: 3.53 UIU/ML (ref 0.45–4.5)
VIT B12 SERPL-MCNC: 193 PG/ML (ref 180–914)
WBC # BLD AUTO: 5.27 THOUSAND/UL (ref 4.31–10.16)

## 2024-05-09 PROCEDURE — 99239 HOSP IP/OBS DSCHRG MGMT >30: CPT | Performed by: INTERNAL MEDICINE

## 2024-05-09 PROCEDURE — 85027 COMPLETE CBC AUTOMATED: CPT

## 2024-05-09 PROCEDURE — 82607 VITAMIN B-12: CPT

## 2024-05-09 PROCEDURE — 93306 TTE W/DOPPLER COMPLETE: CPT | Performed by: INTERNAL MEDICINE

## 2024-05-09 PROCEDURE — 93306 TTE W/DOPPLER COMPLETE: CPT

## 2024-05-09 PROCEDURE — 82306 VITAMIN D 25 HYDROXY: CPT

## 2024-05-09 PROCEDURE — 97165 OT EVAL LOW COMPLEX 30 MIN: CPT

## 2024-05-09 PROCEDURE — 99204 OFFICE O/P NEW MOD 45 MIN: CPT | Performed by: STUDENT IN AN ORGANIZED HEALTH CARE EDUCATION/TRAINING PROGRAM

## 2024-05-09 PROCEDURE — 84443 ASSAY THYROID STIM HORMONE: CPT

## 2024-05-09 PROCEDURE — 80048 BASIC METABOLIC PNL TOTAL CA: CPT

## 2024-05-09 RX ORDER — DIPHENHYDRAMINE HYDROCHLORIDE 50 MG/ML
25 INJECTION INTRAMUSCULAR; INTRAVENOUS EVERY 8 HOURS SCHEDULED
Status: DISCONTINUED | OUTPATIENT
Start: 2024-05-09 | End: 2024-05-09 | Stop reason: HOSPADM

## 2024-05-09 RX ORDER — KETOROLAC TROMETHAMINE 30 MG/ML
30 INJECTION, SOLUTION INTRAMUSCULAR; INTRAVENOUS EVERY 8 HOURS SCHEDULED
Status: DISCONTINUED | OUTPATIENT
Start: 2024-05-09 | End: 2024-05-09

## 2024-05-09 RX ORDER — METOCLOPRAMIDE HYDROCHLORIDE 5 MG/ML
10 INJECTION INTRAMUSCULAR; INTRAVENOUS EVERY 8 HOURS SCHEDULED
Status: DISCONTINUED | OUTPATIENT
Start: 2024-05-09 | End: 2024-05-09

## 2024-05-09 RX ORDER — ATORVASTATIN CALCIUM 40 MG/1
40 TABLET, FILM COATED ORAL EVERY EVENING
Qty: 30 TABLET | Refills: 0 | Status: SHIPPED | OUTPATIENT
Start: 2024-05-09 | End: 2024-06-08

## 2024-05-09 RX ORDER — SODIUM CHLORIDE 9 MG/ML
100 INJECTION, SOLUTION INTRAVENOUS ONCE
Status: DISCONTINUED | OUTPATIENT
Start: 2024-05-09 | End: 2024-05-09

## 2024-05-09 RX ORDER — MAGNESIUM SULFATE HEPTAHYDRATE 40 MG/ML
2 INJECTION, SOLUTION INTRAVENOUS
Status: DISCONTINUED | OUTPATIENT
Start: 2024-05-09 | End: 2024-05-09

## 2024-05-09 RX ORDER — MAGNESIUM SULFATE HEPTAHYDRATE 40 MG/ML
2 INJECTION, SOLUTION INTRAVENOUS
Status: DISCONTINUED | OUTPATIENT
Start: 2024-05-09 | End: 2024-05-09 | Stop reason: HOSPADM

## 2024-05-09 RX ORDER — KETOROLAC TROMETHAMINE 30 MG/ML
30 INJECTION, SOLUTION INTRAMUSCULAR; INTRAVENOUS EVERY 8 HOURS SCHEDULED
Status: DISCONTINUED | OUTPATIENT
Start: 2024-05-09 | End: 2024-05-09 | Stop reason: HOSPADM

## 2024-05-09 RX ORDER — DIPHENHYDRAMINE HYDROCHLORIDE 50 MG/ML
25 INJECTION INTRAMUSCULAR; INTRAVENOUS EVERY 8 HOURS SCHEDULED
Status: DISCONTINUED | OUTPATIENT
Start: 2024-05-09 | End: 2024-05-09

## 2024-05-09 RX ORDER — METOCLOPRAMIDE HYDROCHLORIDE 5 MG/ML
10 INJECTION INTRAMUSCULAR; INTRAVENOUS EVERY 8 HOURS SCHEDULED
Status: DISCONTINUED | OUTPATIENT
Start: 2024-05-09 | End: 2024-05-09 | Stop reason: HOSPADM

## 2024-05-09 RX ORDER — FLUTICASONE PROPIONATE 50 MCG
1 SPRAY, SUSPENSION (ML) NASAL DAILY
Qty: 15.8 ML | Refills: 0 | Status: SHIPPED | OUTPATIENT
Start: 2024-05-09 | End: 2024-05-26

## 2024-05-09 RX ORDER — FLUTICASONE PROPIONATE 50 MCG
1 SPRAY, SUSPENSION (ML) NASAL DAILY
Status: DISCONTINUED | OUTPATIENT
Start: 2024-05-09 | End: 2024-05-09 | Stop reason: HOSPADM

## 2024-05-09 RX ORDER — ASPIRIN 81 MG/1
81 TABLET, CHEWABLE ORAL DAILY
Qty: 30 TABLET | Refills: 0 | Status: SHIPPED | OUTPATIENT
Start: 2024-05-10 | End: 2024-05-26

## 2024-05-09 RX ADMIN — METOCLOPRAMIDE 10 MG: 5 INJECTION, SOLUTION INTRAMUSCULAR; INTRAVENOUS at 03:36

## 2024-05-09 RX ADMIN — ACETAMINOPHEN 975 MG: 325 TABLET, FILM COATED ORAL at 06:13

## 2024-05-09 RX ADMIN — MAGNESIUM SULFATE HEPTAHYDRATE 2 G: 2 INJECTION, SOLUTION INTRAVENOUS at 03:35

## 2024-05-09 RX ADMIN — KETOROLAC TROMETHAMINE 30 MG: 30 INJECTION, SOLUTION INTRAMUSCULAR; INTRAVENOUS at 03:39

## 2024-05-09 RX ADMIN — DIPHENHYDRAMINE HYDROCHLORIDE 25 MG: 50 INJECTION, SOLUTION INTRAMUSCULAR; INTRAVENOUS at 03:42

## 2024-05-09 RX ADMIN — ASPIRIN 81 MG 81 MG: 81 TABLET ORAL at 09:38

## 2024-05-09 NOTE — ASSESSMENT & PLAN NOTE
History of hypertension, reports resolved after gastric bypass surgery in 2015.  Patient currently not on any antihypertensive.  BP acceptable during inpatient. Recommend patient follow up with PCP outpatient for further monitoring  and management.

## 2024-05-09 NOTE — CONSULTS
NEUROLOGY RESIDENCY CONSULT NOTE     Name: Jay Smith   Age & Sex: 59 y.o. male   MRN: [id number]  Unit/Bed#: -01   Encounter: [id number]  Length of Stay: 0    Jay Smith will need follow up in in 6 weeks with neurovascular attending, resident, or AP .  He will not require outpatient neurological testing. This should be completed prior to removing patient from list or discharge     Pending for discharge: None    ASSESSMENT & PLAN     * Stroke-like symptoms  Assessment & Plan  Patient is 59-year-old male with past medical history of concussion and hypertension.  At 09 100 on 05/08 patient noted that he was having memory deficits and confusion.  He fell unable to perform work tasks that he regularly does due to not remembering how to do them.  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  Patient did not feel any weakness or numbness.  Arrived to the emergency department with blood pressure 184/102.  After arrival to the emergency department patient was not experiencing any symptoms but did have a mild headache.  Patient does not have headaches regularly.  CT H/N and MRI did not show any signs of acute ischemic or hemorrhagic changes.  MRI did show nonspecific white matter changes consistent with migraine or microangiopathic changes.  On morning of 05/09 patient not endorsing any symptoms.    Plan:  TSH  Vitamin D  Vitamin B12  Lyme IgG/IgM  Continue atorvastatin and aspirin 81 mg daily  Follow-up on results of echo  Recommend Zio patch   Recommend patient follow-up with cardiology outpatient  Patient to follow-up with outpatient neurovascular 6 weeks after discharge      SUBJECTIVE     Reason for Consult / Principal Problem: Stroke-like symptoms  Hx and PE limited by: None    HPI: Jay Smith is a 59 y.o. male with past medical history of concussion, hypertension, that presented to the hospital on 5/8/2024  with strokelike symptoms.  Per ED note, patient presented to ED after school nurse noted left-sided weakness and left eye droopiness. Patient noted memory deficits starting at approximately at 0900 on 05/08. Patient reported he has been experiencing these throughout the day, difficulty with using his phone, or remembering things well. He did not notice left sided weakness or left sided facial drooping. Symptoms resolved on ED evaluation, no deficits, NIH stroke scale 0, no headache, no chest pain, no back pain, no fever or chills, no nausea, vomiting, abdominal pain, history of the same, denies smoking or drugs, denies alcohol use    Blood pressure 184/102, pulse 82, respirations 18, SpO2 96% on room air, temperature 98.2 °F      Inpatient consult to Neurology  Consult performed by: Romario Lindo DO  Consult ordered by: Eugenie Munoz DO        Historical Information   Past Medical History:   Diagnosis Date    Hypertension      Past Surgical History:   Procedure Laterality Date    BACK SURGERY      GASTRIC BYPASS      KNEE SURGERY      SHOULDER SURGERY       Social History   Social History     Substance and Sexual Activity   Alcohol Use Not Currently     Social History     Substance and Sexual Activity   Drug Use Never     E-Cigarette/Vaping    E-Cigarette Use Never User      E-Cigarette/Vaping Substances    Nicotine No     THC No     CBD No     Flavoring No     Other No     Unknown No      Social History     Tobacco Use   Smoking Status Never    Passive exposure: Never   Smokeless Tobacco Never     Family History:   Family History   Problem Relation Age of Onset    Heart attack Father     Diabetes Father      Meds/Allergies   PTA meds:   None     No Known Allergies    Review of previous medical records was completed.    Review of Systems   Constitutional:  Negative for activity change, appetite change, fatigue and fever.   HENT: Negative.     Eyes: Negative.    Respiratory:  Negative for cough and choking.   "  Cardiovascular:  Negative for chest pain.   Gastrointestinal:  Negative for abdominal distention, abdominal pain, diarrhea, nausea and vomiting.   Neurological:  Negative for dizziness, tremors, syncope, facial asymmetry (Patient does not feel that he had facial drooping but school nurse at work reported he had L sided facial drooping.), speech difficulty, weakness and headaches.       OBJECTIVE     Patient ID: Jay Smith is a 59 y.o. male.    Vitals:   Vitals:    24 0256 24 0700 24 0904 24 1146   BP: 135/78 147/89 147/89 156/92   BP Location: Right arm Right arm  Left arm   Pulse: 61 65 65 75   Resp: 17 18  18   Temp: 98 °F (36.7 °C) 98.1 °F (36.7 °C)  98.5 °F (36.9 °C)   TempSrc: Oral Oral  Oral   SpO2:  95%  96%   Weight: 100 kg (221 lb)  100 kg (221 lb)    Height: 5' 11\" (1.803 m)  5' 11\" (1.803 m)       Body mass index is 30.82 kg/m².     Intake/Output Summary (Last 24 hours) at 2024 1155  Last data filed at 2024 1725  Gross per 24 hour   Intake 50 ml   Output --   Net 50 ml     Temperature:   Temp (24hrs), Av.1 °F (36.7 °C), Min:97.9 °F (36.6 °C), Max:98.5 °F (36.9 °C)    Temperature: 98.5 °F (36.9 °C)    Invasive Devices:   Invasive Devices       Peripheral Intravenous Line  Duration             Peripheral IV 24 Left Antecubital <1 day                    Physical Exam  Constitutional:       Appearance: Normal appearance.   HENT:      Head: Normocephalic.   Cardiovascular:      Rate and Rhythm: Normal rate.   Pulmonary:      Effort: Pulmonary effort is normal.   Musculoskeletal:         General: No swelling or tenderness.      Right lower leg: No edema.      Left lower leg: No edema.   Neurological:      Mental Status: He is alert and oriented to person, place, and time.      Cranial Nerves: Cranial nerves 2-12 are intact.      Motor: Motor strength is normal.         Neurologic Exam     Mental Status   Oriented to person, place, and time.   Level of " consciousness: alert  Knowledge: good.     Cranial Nerves   Cranial nerves II through XII intact.     Motor Exam     Strength   Strength 5/5 throughout.     Sensory Exam   Light touch normal.   Right arm vibration: normal  Left arm vibration: normal  Right leg vibration: normal  Left leg vibration: decreased from ankle    Gait, Coordination, and Reflexes     Reflexes   Reflexes 2+ except as noted.          LABORATORY DATA     Labs: I have personally reviewed pertinent reports.    Results from last 7 days   Lab Units 05/09/24  0513 05/08/24  1318   WBC Thousand/uL 5.27 6.98   HEMOGLOBIN g/dL 13.7 14.6   HEMATOCRIT % 41.3 44.2   PLATELETS Thousands/uL 164 172   SEGS PCT %  --  79*   MONO PCT %  --  7   EOS PCT %  --  1      Results from last 7 days   Lab Units 05/09/24  0513 05/08/24  1318   POTASSIUM mmol/L 3.8 4.9   CHLORIDE mmol/L 107 105   CO2 mmol/L 26 28   BUN mg/dL 11 12   CREATININE mg/dL 0.76 0.88   CALCIUM mg/dL 8.7 9.4                            IMAGING & DIAGNOSTIC TESTING     Radiology Results: I have personally reviewed pertinent reports.    MRI brain wo contrast   Final Result by Karan Babcock MD (05/08 7966)         1. No evidence of acute infarct, intracranial hemorrhage or mass.   2. Few nonspecific white matter lesions could represent sequela of migraine or early microangiopathic change.      Workstation performed: [id number]         CTA head and neck with and without contrast   Final Result by Lopez Corona MD (05/08 3308)      1.  No acute intracranial CT abnormality.   2.  Patent major vessels of the [place] of hdz without high-grade stenosis.  No aneurysm.   3.  No hemodynamically significant stenosis in the cervical carotid and vertebral arteries.                  Workstation performed: [id number]             Other Diagnostic Testing: I have personally reviewed pertinent reports.      ACTIVE MEDICATIONS     Current Facility-Administered Medications   Medication Dose Route Frequency     acetaminophen (TYLENOL) tablet 975 mg  975 mg Oral Q8H LINDSEY    aspirin chewable tablet 81 mg  81 mg Oral Daily    atorvastatin (LIPITOR) tablet 40 mg  40 mg Oral QPM    diphenhydrAMINE (BENADRYL) injection 25 mg  25 mg Intravenous Q8H LINDSEY    fluticasone (FLONASE) 50 mcg/act nasal spray 1 spray  1 spray Each Nare Daily    ketorolac (TORADOL) injection 30 mg  30 mg Intravenous Q8H LINDSEY    magnesium sulfate 2 g/50 mL IVPB (premix) 2 g  2 g Intravenous Q24H LINDSEY    metoclopramide (REGLAN) injection 10 mg  10 mg Intravenous Q8H LINDSEY     Prior to Admission medications    Not on File     CODE STATUS & ADVANCED DIRECTIVES     Code Status: Level 1 - Full Code  Advance Directive and Living Will:      Power of :    POLST:        VTE Pharmacologic Prophylaxis:   VTE Mechanical Prophylaxis:     ======    I have discussed the patient's history, physical exam findings, assessment, and plan in detail with attending, Dr. torres    Thank you for allowing me to participate in the care of your patient, Jay Smith.    Cesar Diego, DO

## 2024-05-09 NOTE — PLAN OF CARE

## 2024-05-09 NOTE — DISCHARGE SUMMARY
[facility]  Discharge- Jay Smith 1965, 59 y.o. male MRN: [id number]  Unit/Bed#: -[facility]io Encounter: [id number]  Primary Care Provider: David Oakes MD   Date and time admitted to hospital: 5/8/2024  1:00 PM    * Stroke-like symptoms  Assessment & Plan  POA with difficulty recalling things and confusion while patient was at work.  Episode of difficulty recalling things starting at approximately 9 am.  NIHSS score 0, per ED. No focal neurological deficits noted on this examination.  CTA head and neck with no intracranial abnormality. Major vessels are patent without high-grade stenosis.  No aneurysm.  No significant stenosis in the cervical carotid or vertebral arteries.  MRI of the brain: No evidence of acute infarct, intracranial hemorrhage or mass.  Few nonspecific white matter lesions could represent sequela of migraine or early microangiopathic change.  Echo with EF of 65%, mild concentric hypertrophy.  No PFO detected.  ASCVD risk 5.9%-recommend moderate intensity statin.  A1c: 5.4. Thyroid function normal.   Etiology likely TIA vs hypertensive urgency vs atypical migraine.     Plan:   Atorvastatin 40 mg qd  Aspirin 81 qd   B12, Vitamin D, Lyme antibodies pending - patient aware, will have PCP follow up.   Cleared by neurology for discharge - Recommended Zio patch in outpatient setting, will place referral to cardiology.     HTN (hypertension)  Assessment & Plan  History of hypertension, reports resolved after gastric bypass surgery in 2015.  Patient currently not on any antihypertensive.  BP acceptable during inpatient. Recommend patient follow up with PCP outpatient for further monitoring  and management.         Medical Problems       Resolved Problems  Date Reviewed: 4/9/2019   None       Discharging Resident: Eugenie Munoz DO  Discharging Attending: Elisabeth Gutiérrez MD  PCP: David Oakes MD  Admission Date:   Admission Orders (From admission, onward)        Ordered        05/08/24 1557  Place in Observation  Once                          Discharge Date: 05/09/24    Consultations During Hospital Stay:  Neurology    Procedures Performed:   Echo with EF of 65%, mild concentric hypertrophy.  No PFO detected.    Significant Findings / Test Results:   MRI brain wo contrast   Final Result by Karan Babcock MD (05/08 7876)         1. No evidence of acute infarct, intracranial hemorrhage or mass.   2. Few nonspecific white matter lesions could represent sequela of migraine or early microangiopathic change.      Workstation performed: [id number]         CTA head and neck with and without contrast   Final Result by Lopez Corona MD (05/08 3036)      1.  No acute intracranial CT abnormality.   2.  Patent major vessels of the [place] of h[place] without high-grade stenosis.  No aneurysm.   3.  No hemodynamically significant stenosis in the cervical carotid and vertebral arteries.                  Workstation performed: [id number]             Incidental Findings:   None       Test Results Pending at Discharge (will require follow up):  B12, Vitamin D, Lyme antibodies      Outpatient Tests Requested:  None    Complications:  None    Reason for Admission: Stroke like symptoms     Hospital Course:   Jay Smith is a 59 y.o. male patient who originally presented to the hospital on 5/8/2024 due due to difficulty recalling things and confusion that lasted about 6 hours while patient was at work.  Per school nurse, noticed some left eye drooping but patient unsure of this. On arrival to ED, patient was hypertensive, improved without intervention.  Initial NIHSS score was 0.  Patient was given a loading dose of aspirin and high-dose statin. CT head and MRI were negative for any acute infarcts.  Echo with EF of 65% with no PFO detected.  Patient was cleared by neurology for discharge with aspirin 81 mg daily and atorvastatin 40 mg daily. Patient was given referral for outpatient  "cardiology for Zio patch. Otherwise patient was stable for discharge.    The patient, initially admitted to the hospital as inpatient, was discharged earlier than expected given the following: Neurology clearance and improvement of symptoms.  Please see above list of diagnoses and related plan for additional information.     Condition at Discharge: fair    Discharge Day Visit / Exam:   Subjective: Overnight patient reported having headaches, was given migraine cocktail.  Reports some improvement.  He denies any recurrence of his symptoms.  During rounds, he reported having some congestion, was given Flonase nasal spray.  He does have an upcoming appointment with ENT.    Vitals: Blood Pressure: 156/92 (05/09/24 1146)  Pulse: 75 (05/09/24 1146)  Temperature: 98.5 °F (36.9 °C) (05/09/24 1146)  Temp Source: Oral (05/09/24 1146)  Respirations: 18 (05/09/24 1146)  Height: 5' 11\" (180.3 cm) (05/09/24 0904)  Weight - Scale: 100 kg (221 lb) (05/09/24 0904)  SpO2: 96 % (05/09/24 1146)  Exam:   Physical Exam  Vitals and nursing note reviewed.   Constitutional:       Appearance: Normal appearance.   HENT:      Head: Normocephalic and atraumatic.   Eyes:      Extraocular Movements: Extraocular movements intact.      Conjunctiva/sclera: Conjunctivae normal.   Cardiovascular:      Rate and Rhythm: Normal rate and regular rhythm.      Pulses: Normal pulses.   Pulmonary:      Effort: Pulmonary effort is normal.      Breath sounds: Normal breath sounds.   Abdominal:      General: Bowel sounds are normal.      Palpations: Abdomen is soft.   Skin:     General: Skin is warm and dry.      Capillary Refill: Capillary refill takes less than 2 seconds.   Neurological:      Mental Status: He is alert and oriented to person, place, and time. Mental status is at baseline.          Discussion with Family: Patient declined call to .     Discharge instructions/Information to patient and family:   See after visit summary for " information provided to patient and family.      Provisions for Follow-Up Care:  See after visit summary for information related to follow-up care and any pertinent home health orders.      Mobility at time of Discharge:   Basic Mobility Inpatient Raw Score: 24  JH-HLM Goal: 8: Walk 250 feet or more  JH-HLM Achieved: 7: Walk 25 feet or more  HLM Goal NOT achieved. Continue to encourage mobility in post discharge setting.     Disposition:   Home    Planned Readmission: No    Discharge Medications:  See after visit summary for reconciled discharge medications provided to patient and/or family.      **Please Note: This note may have been constructed using a voice recognition system**

## 2024-05-09 NOTE — CASE MANAGEMENT
Case Management Assessment    Patient name Jay Smith  Location /-01 MRN [id number]  : 1965 Date 2024       Current Admission Date: 2024  Current Admission Diagnosis:Stroke-like symptoms   Patient Active Problem List    Diagnosis Date Noted    Stroke-like symptoms 2024    HTN (hypertension) 2024    Left shoulder pain 2019    Concussion 2019      LOS (days): 0  Geometric Mean LOS (GMLOS) (days):   Days to GMLOS:     OBJECTIVE:              Current admission status: Observation       Preferred Pharmacy:   [id number] - [place], [place] - [address]  [address]  [place] [address]  Phone: [phone] Fax: [phone]    Primary Care Provider: David Oakes MD    Primary Insurance: Fwd: Power  Secondary Insurance:     ASSESSMENT:  Active Health Care Proxies    There are no active Health Care Proxies on file.                 Readmission Root Cause  30 Day Readmission: No    Patient Information  Admitted from:: Home  Mental Status: Alert  During Assessment patient was accompanied by: Not accompanied during assessment  Assessment information provided by:: Patient  Support Systems: Spouse/significant other, Family members  Home entry access options. Select all that apply.: Stairs  Number of steps to enter home.: 2  Do the steps have railings?: No  Type of Current Residence: [facility]  Living Arrangements: Lives w/ Spouse/significant other, Lives w/ Daughter    Activities of Daily Living Prior to Admission  Functional Status: Independent  Completes ADLs independently?: Yes  Ambulates independently?: Yes  Does patient use assisted devices?: No  Does patient currently own DME?: No  Does patient have a history of Outpatient Therapy (PT/OT)?: Yes  Does the patient have a history of Short-Term Rehab?: No  Does patient have a history of HHC?: No  Does patient currently have HHC?: No         Patient Information Continued  Income Source: Employed  Does  patient have prescription coverage?: Yes  Does patient receive dialysis treatments?: No  Does patient have a history of substance abuse?: No  Does patient have a history of Mental Health Diagnosis?: No    PHQ 2/9 Screening   Reviewed PHQ 2/9 Depression Screening Score?: No    Means of Transportation  Means of Transport to Appts:: Drives Self      Social Determinants of Health (SDOH)      Flowsheet Row Most Recent Value   Housing Stability    In the last 12 months, was there a time when you were not able to pay the mortgage or rent on time? N   In the last 12 months, was there a time when you did not have a steady place to sleep or slept in a shelter (including now)? N   Transportation Needs    In the past 12 months, has lack of transportation kept you from medical appointments or from getting medications? no   In the past 12 months, has lack of transportation kept you from meetings, work, or from getting things needed for daily living? No   Food Insecurity    Within the past 12 months, you worried that your food would run out before you got the money to buy more. Never true   Within the past 12 months, the food you bought just didn't last and you didn't have money to get more. Never true   Utilities    In the past 12 months has the electric, gas, oil, or water company threatened to shut off services in your home? No

## 2024-05-09 NOTE — ASSESSMENT & PLAN NOTE
POA with difficulty recalling things and confusion while patient was at work.  Episode of difficulty recalling things starting at approximately 9 am.  NIHSS score 0, per ED. No focal neurological deficits noted on this examination.  CTA head and neck with no intracranial abnormality. Major vessels are patent without high-grade stenosis.  No aneurysm.  No significant stenosis in the cervical carotid or vertebral arteries.  MRI of the brain: No evidence of acute infarct, intracranial hemorrhage or mass.  Few nonspecific white matter lesions could represent sequela of migraine or early microangiopathic change.  Echo with EF of 65%, mild concentric hypertrophy.  No PFO detected.  ASCVD risk 5.9%-recommend moderate intensity statin.  A1c: 5.4. Thyroid function normal.   Etiology likely TIA vs hypertensive urgency vs atypical migraine.     Plan:   Atorvastatin 40 mg qd  Aspirin 81 qd   B12, Vitamin D, Lyme antibodies pending - patient aware, will have PCP follow up.   Cleared by neurology for discharge - Recommended Zio patch in outpatient setting, will place referral to cardiology.

## 2024-05-09 NOTE — ASSESSMENT & PLAN NOTE
Patient is 59-year-old male with past medical history of concussion and hypertension.  At 09 100 on 05/08 patient noted that he was having memory deficits and confusion.  He fell unable to perform work tasks that he regularly does due to not remembering how to do them.  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  Patient did not feel any weakness or numbness.  Arrived to the emergency department with blood pressure 184/102.  After arrival to the emergency department patient was not experiencing any symptoms but did have a mild headache.  Patient does not have headaches regularly.  CT H/N and MRI did not show any signs of acute ischemic or hemorrhagic changes.  MRI did show nonspecific white matter changes consistent with migraine or microangiopathic changes.  On morning of 05/09 patient not endorsing any symptoms.    Plan:  TSH  Vitamin D  Vitamin B12  Lyme IgG/IgM  Continue atorvastatin and aspirin 81 mg daily  Follow-up on results of echo  Recommend Zio patch   Recommend patient follow-up with cardiology outpatient  Patient to follow-up with outpatient neurovascular 6 weeks after discharge

## 2024-05-09 NOTE — SPEECH THERAPY NOTE
Speech Language/Pathology    Speech/Language Pathology Screen Note        Consult received for speech/swallow eval on stroke pathway. Pt passed nsg swallow screen; tolerating regular diet w/o s/s dysphagia or aspiration. No speech/language deficits reported.     NIH score is 0.    MRI:   1. No evidence of acute infarct, intracranial hemorrhage or mass.  2. Few nonspecific white matter lesions could represent sequela of migraine or early microangiopathic change.    No need for formal speech/swallow eval at this time. Reconsult if needed.      Blanca Wilson MS, CCC-SLP  Speech Pathologist  Available via [employer]

## 2024-05-09 NOTE — ASSESSMENT & PLAN NOTE
POA with difficulty recalling things and confusion while patient was at work.  Episode of difficulty recalling things starting at approximately 9 am.  NIHSS score 0, per ED. No focal neurological deficits noted on this examination.  CTA head and neck with no intracranial abnormality. Major vessels are patent without high-grade stenosis.  No aneurysm.  No significant stenosis in the cervical carotid or vertebral arteries.  MRI of the brain: No evidence of acute infarct, intracranial hemorrhage or mass.  Few nonspecific white matter lesions could represent sequela of migraine or early microangiopathic change.  Echo with EF of 65%, mild concentric hypertrophy.  No PFO detected.  ASCVD risk 5.9%-recommend moderate intensity statin.  A1c: 5.4. Thyroid function normal.   Etiology likely TIA versus hypertensive urgency.    Plan:   Atorvastatin 40 mg qd  Aspirin 81 qd   B12, Vitamin D, Lyme antibodies pending - patient aware, will have PCP follow up.   Cleared by neurology for discharge - Recommended Zio patch in outpatient setting, will place referral to cardiology.

## 2024-05-09 NOTE — UTILIZATION REVIEW
"Initial Clinical Review    Admission: Date/Time/Statement:   Admission Orders (From admission, onward)       Ordered        05/08/24 1557  Place in Observation  Once                          Orders Placed This Encounter   Procedures    Place in Observation     Standing Status:   Standing     Number of Occurrences:   1     Order Specific Question:   Level of Care     Answer:   Med Surg [16]     ED Arrival Information       Expected   -    Arrival   5/8/2024 13:00    Acuity   Emergent              Means of arrival   Walk-In    Escorted by   Self    Service   Hospitalist    Admission type   Emergency              Arrival complaint   stroke like symptoms             Chief Complaint   Patient presents with    CVA/TIA-like Symptoms     Pt presents from work where school nurse stated his left side was weak and \"left eye was droopy\" Presents from WR with no weakness and neurologically intact. States he feels he is in a daze.        Initial Presentation: 59 y.o. male with hx HTN - not on antihypertensive since gastric bypass 2015 who presents to ED from work with difficulty recalling things and confusion while he was at work that last few hours. School nurse reported that he had left eyelid droop, however he is unsure/denies this . ON exam, BP elevated , 184/102  back to baseline except for feeling a little dazed  . Has mild HA . GCS 15 .NIHSS 0 .  CTA head and neck with no acute intracranial abnormality. Major vessels are patent without high-grade stenosis. No aneurysm. No significant stenosis in the cervical carotid or vertebral arteries . ECG- Sinus rhythm w/ sinus arrhythmia . Pt given IV Tylenol in ED. Admitted as OBS to telemetry with stroke like sx , HTN . Plan- telemetry , neurology consult. BP goals < 220/110 for 24 hours . ASA load with 325 mg then 81 mg daily . Statin daily . A1c, lipid panel . SCD for DVT ppx. Neuro checks           Anticipated Length of Stay/Certification Statement:  Patient will be admitted on " an observation basis with an anticipated length of stay of less than 2 midnights secondary to stroke rule out.     Date: 5/9     ED Triage Vitals [05/08/24 1305]   Temperature Pulse Respirations Blood Pressure SpO2   98.2 °F (36.8 °C) 83 18 (!) 184/102 96 %      Temp Source Heart Rate Source Patient Position - Orthostatic VS BP Location FiO2 (%)   Oral Monitor Sitting Right arm --      Pain Score       No Pain          Wt Readings from Last 1 Encounters:   05/09/24 100 kg (221 lb)     Additional Vital Signs:   Date/Time Temp Pulse Resp BP MAP (mmHg) SpO2   05/09/24 0740 -- -- -- -- -- --   05/09/24 0700 98.1 °F (36.7 °C) 65 18 147/89 -- 95 %   05/09/24 0256 98 °F (36.7 °C) 61 17 135/78 -- --   05/09/24 0200 98 °F (36.7 °C) 61 17 135/78 100 97 %   05/09/24 0000 97.9 °F (36.6 °C) 61 16 133/77 100 94 %   05/08/24 2300 -- 62 -- 139/84 104 96 %   05/08/24 2200 -- 63 -- 143/88 111 95 %   05/08/24 2100 -- 66 -- 165/88 116 96 %   05/08/24 2015 98 °F (36.7 °C) 78 17 165/100 125 96 %   05/08/24 1900 98 °F (36.7 °C) 68 17 147/90 113 95 %   05/08/24 1800 -- 63 -- 150/86 108 94 %   05/08/24 1700 98 °F (36.7 °C) 70 18 164/95 124 95 %   05/08/24 1600 -- 66 17 169/106 Abnormal  131 96 %   05/08/24 1530 -- 65 -- 171/101 Abnormal  131 98 %   05/08/24 1508 -- 70 -- 164/90 120 97 %   05/08/24 1400 -- 75 -- 156/102 Abnormal  124 96 %       Date and Time Eye Opening Best Verbal Response Best Motor Response Jaylon Coma Scale Score   05/09/24 0740 4 5 6 15   05/09/24 0256 4 5 6 15   05/08/24 2137 4 5 6 15   05/08/24 1700 4 5 6 15   05/08/24 1400 4 5 6 15           Pertinent Labs/Diagnostic Test Results:    5/8 ECG- Normal sinus rhythm with sinus arrhythmia   MRI brain wo contrast   Final Result by Karan Babcock MD (05/08 2146)         1. No evidence of acute infarct, intracranial hemorrhage or mass.   2. Few nonspecific white matter lesions could represent sequela of migraine or early microangiopathic change.      Workstation  "performed: [id number]         CTA head and neck with and without contrast   Final Result by Lopez Corona MD (05/08 1457)      1.  No acute intracranial CT abnormality.   2.  Patent major vessels of the [place] of hdz without high-grade stenosis.  No aneurysm.   3.  No hemodynamically significant stenosis in the cervical carotid and vertebral arteries.                  Workstation performed: [id number]               Results from last 7 days   Lab Units 05/09/24  0513 05/08/24  1318   WBC Thousand/uL 5.27 6.98   HEMOGLOBIN g/dL 13.7 14.6   HEMATOCRIT % 41.3 44.2   PLATELETS Thousands/uL 164 172   TOTAL NEUT ABS Thousands/µL  --  5.56         Results from last 7 days   Lab Units 05/09/24  0513 05/08/24  1318   SODIUM mmol/L 139 138   POTASSIUM mmol/L 3.8 4.9   CHLORIDE mmol/L 107 105   CO2 mmol/L 26 28   ANION GAP mmol/L 6 5   BUN mg/dL 11 12   CREATININE mg/dL 0.76 0.88   EGFR ml/min/1.73sq m 100 94   CALCIUM mg/dL 8.7 9.4         Results from last 7 days   Lab Units 05/08/24  1303   POC GLUCOSE mg/dl 111     Results from last 7 days   Lab Units 05/09/24  0513 05/08/24  1318   GLUCOSE RANDOM mg/dL 103 116             No results found for: \"BETA-HYDROXYBUTYRATE\"                                                                                                                                         ED Treatment:   Medication Administration from 05/08/2024 1259 to 05/09/2024 0232         Date/Time Order Dose Route Action Comments     05/08/2024 1421 EDT iohexol (OMNIPAQUE) 350 MG/ML injection (SINGLE-DOSE) 85 mL 85 mL Intravenous Given --     05/08/2024 1725 EDT acetaminophen (Ofirmev) injection 1,000 mg 0 mg Intravenous Stopped --     05/08/2024 1611 EDT acetaminophen (Ofirmev) injection 1,000 mg 1,000 mg Intravenous New Bag --     05/08/2024 1731 EDT atorvastatin (LIPITOR) tablet 40 mg 40 mg Oral Given --     05/08/2024 1731 EDT aspirin tablet 325 mg 325 mg Oral Given --     05/08/2024 2133 EDT acetaminophen (TYLENOL) " tablet 650 mg 650 mg Oral Given --     05/08/2024 2156 EDT acetaminophen (TYLENOL) tablet 975 mg 975 mg Oral Not Given gave PRN          Past Medical History:   Diagnosis Date    Hypertension      Present on Admission:  **None**      Admitting Diagnosis: TIA (transient ischemic attack) [G45.9]  Stroke-like symptoms [R29.90]  Age/Sex: 59 y.o. male  Admission Orders:  Scheduled Medications:  acetaminophen, 975 mg, Oral, Q8H LINDSEY  aspirin, 81 mg, Oral, Daily  atorvastatin, 40 mg, Oral, QPM  diphenhydrAMINE, 25 mg, Intravenous, Q8H LINDSEY  ketorolac, 30 mg, Intravenous, Q8H LINDSEY  magnesium sulfate, 2 g, Intravenous, Q24H LINDSEY  metoclopramide, 10 mg, Intravenous, Q8H LINDSEY      Continuous IV Infusions:     PRN Meds:       Reg diet   Telemetry   Neuro checks    NIHSS   OOB as tarsha      IP CONSULT TO NEUROLOGY  IP CONSULT TO CASE MANAGEMENT  IP CONSULT TO NUTRITION SERVICES    Network Utilization Review Department  ATTENTION: Please call with any questions or concerns to [phone] and carefully listen to the prompts so that you are directed to the right person. All voicemails are confidential.   For Discharge needs, contact Care Management DC Support Team at [phone] opt. 2  Send all requests for admission clinical reviews, approved or denied determinations and any other requests to dedicated fax number below belonging to the campus where the patient is receiving treatment. List of dedicated fax numbers for the Facilities:  FACILITY NAME UR FAX NUMBER   ADMISSION DENIALS (Administrative/Medical Necessity) [phone]   DISCHARGE SUPPORT TEAM (NETWORK) [phone]   PARENT CHILD HEALTH (Maternity/NICU/Pediatrics) [phone]   [facility] [phone]   [facility] [phone]   [facility] [phone]   [facility] [phone]   [facility] [phone]   [facility]  [phone]   [facility] [phone]   [facility] [phone]   [facility] [phone]   [facility] [phone]   [facility] [phone]   [facility] [phone]

## 2024-05-09 NOTE — OCCUPATIONAL THERAPY NOTE
"    Occupational Therapy Evaluation     Patient Name: Jay Smith  Today's Date: 5/9/2024  Problem List  Principal Problem:    Stroke-like symptoms  Active Problems:    HTN (hypertension)    Past Medical History  Past Medical History:   Diagnosis Date    Hypertension      Past Surgical History  Past Surgical History:   Procedure Laterality Date    BACK SURGERY      GASTRIC BYPASS      KNEE SURGERY      SHOULDER SURGERY           05/09/24 0900   OT Last Visit   OT Visit Date 05/09/24   Note Type   Note type Evaluation   Pain Assessment   Pain Assessment Tool 0-10   Pain Score 2   Pain Location/Orientation Location: Head   Pain Onset/Description Descriptor: Headache   Restrictions/Precautions   Weight Bearing Precautions Per Order No   Other Precautions Telemetry   Home Living   Type of Home House   Home Layout One level;Able to live on main level with bedroom/bathroom;Performs ADLs on one level   Bathroom Shower/Tub Tub/shower unit   Bathroom Toilet Standard   Bathroom Accessibility Accessible   Additional Comments pt active and independent w/ no AD   Prior Function   Level of [place] Independent with ADLs;Independent with functional mobility;Independent with IADLS   Lives With Spouse   Receives Help From Family   IADLs Independent with driving;Independent with medication management;Independent with meal prep   Falls in the last 6 months 0   Vocational Full time employment   Comments pt fulltime    Lifestyle   Autonomy per pt independent w/ ADLs, independent w/ functional transfers and mobility w/ no Ad, independent w/ IADLs, driving, working as a teacher   Reciprocal Relationships family   Service to Others works as a    General   Additional General Comments Pt reports his memory is slowing coming back   Subjective   Subjective \"I am doing well\"   ADL   Where Assessed Chair   Eating Assistance 7  Independent   Grooming Assistance 7  Independent   UB Bathing " "Assistance 7  Independent   LB Bathing Assistance 7  Independent   UB Dressing Assistance 7  Independent   LB Dressing Assistance 7  Independent   Toileting Assistance  7  Independent   Functional Assistance 7  Independent   Bed Mobility   Supine to Sit 7  Independent   Sit to Supine 7  Independent   Transfers   Sit to Stand 7  Independent   Stand to Sit 7  Independent   Additional Comments reports slight dizziness w/ movement which then resolved   Functional Mobility   Functional Mobility 7  Independent   Additional Comments w/ no AD   Balance   Static Sitting Normal   Dynamic Sitting Normal   Static Standing Normal   Dynamic Standing Normal   Ambulatory Normal   Activity Tolerance   Activity Tolerance Patient tolerated treatment well   Nurse Made Aware appropriate to see per Sophie KIRAN   RUJASON Assessment   RUE Assessment WNL   LUE Assessment   LUE Assessment WNL   Hand Function   Gross Motor Coordination Functional   Fine Motor Coordination Functional   Hand Function Comments WNL   Sensation   Light Touch No apparent deficits   Sharp/Dull No apparent deficits   Proprioception   Proprioception No apparent deficits   Vision-Basic Assessment   Current Vision No visual deficits   Patient Visual Report   (reports when episode happened had blury vision, now no vision deficits)   Vision - Complex Assessment   Ocular Range of Motion Intact   Tracking Intact   Acuity Able to read clock/calendar on wall without difficulty   Perception   Inattention/Neglect Appears intact   Cognition   Overall Cognitive Status WFL   Arousal/Participation Alert;Responsive;Cooperative   Attention Within functional limits   Orientation Level Oriented X4   Memory Within functional limits   Following Commands Follows all commands and directions without difficulty   Comments pt reports memory slowly coming back, able to recall 5/5 works, and able to state 13 words beginning w/ \"T\" in one minute; 15/15 on MOCA mini; educated pt on strategies for memory " "as he reports it is improving but not back to 100%   Assessment   Assessment Pt is a 59 y.o. male seen for OT evaluation s/p admit to [facility] on 5/8/2024 w/ Stroke-like symptoms, confusion, difficulty w/ recalling things and speech difficulties. Comorbidities affecting pt's functional performance at time of assessment include: HTN, h/o back surgery. Personal factors affecting pt at time of IE include:steps to enter environment. Prior to admission, pt was independent in all areas of occupation w/ no AD, driving and working as a . Upon evaluation: Pt requires independent w/ ADLs, independent w/ functional transfers and mobility w/ no Ad, independent w/ simulated IADLs 2* the following deficits impacting occupational performance: reports slower recall, slight dizziness w/ mobility which then resolved, scored normal 15/15 on MOCA mini. Pt appears to be at baseline w/ no inpt OT needs warranted. Pt educated on BEFAST and recalled.  From OT standpoint, recommendation at time of d/c would be no inpatient OT needs.   The patient's raw score on the AM-PAC Daily Activity Inpatient Short Form is 24. A raw score of greater than or equal to 19 suggests the patient may benefit from discharge to home. Please refer to the recommendation of the Occupational Therapist for safe discharge planning.   Goals   Patient Goals \"get back to my normal\"   Plan   OT Frequency Eval only   Discharge Recommendation   Rehab Resource Intensity Level, OT No post-acute rehabilitation needs   AM-PAC Daily Activity Inpatient   Lower Body Dressing 4   Bathing 4   Toileting 4   Upper Body Dressing 4   Grooming 4   Eating 4   Daily Activity Raw Score 24   Daily Activity Standardized Score (Calc for Raw Score >=11) 57.54   AM-PAC Applied Cognition Inpatient   Following a Speech/Presentation 4   Understanding Ordinary Conversation 4   Taking Medications 4   Remembering Where Things Are Placed or Put Away 4   Remembering List of 4-5 " Errands 4   Taking Care of Complicated Tasks 4   Applied Cognition Raw Score 24   Applied Cognition Standardized Score 62.21   End of Consult   Education Provided Yes   Patient Position at End of Consult All needs within reach   Nurse Communication Nurse aware of consult     Documentation completed by: Savanna Mederos MS, OTR/L

## 2024-05-09 NOTE — PHYSICAL THERAPY NOTE
PHYSICAL THERAPY SCREEN NOTE          Patient Name: Jay Smith  Today's Date: 5/9/2024 05/09/24 0955   Note Type   Note type Screen   Additional Comments PT orders received, chart review performed. Per [facility] documentation in, pt has been mobilizing independently (scored 24 at 0700). Entered pt's room to educate pt on role of PT in acute care to assess mobility and assist w/ DC recommendation. Pt confirmed he has been up and ambulating to/from the bathroom down the hallway independently and confirmed no mobility concerns at this time or upon DC. Will screen and DC pt from Inpatient PT caseload due to pt w/o acute PT needs. Please re-consult PT if needs arise.         Lora Parisi, PT, DPT  05/09/24

## 2024-05-09 NOTE — DISCHARGE INSTR - AVS FIRST PAGE
Dear Jay Smith,     It was our pleasure to care for you here at [facility].  It is our hope that we were always able to exceed the expected standards for your care during your stay.  You were hospitalized due to difficulty recalling things and confusion.  You were cared for on the fourth floor by Eugenie Munoz DO under the service of Elisabeth Gutiérrez MD with the [facility]ist Group who covers for your primary care physician (PCP), David Oakes MD, while you were hospitalized.  If you have any questions or concerns related to this hospitalization, you may contact us at .  For follow up as well as any medication refills, we recommend that you follow up with your primary care physician.  A registered nurse will reach out to you by phone within a few days after your discharge to answer any additional questions that you may have after going home.  However, at this time we provide for you here, the most important instructions / recommendations at discharge:     Notable Medication Adjustments -   Please start taking aspirin 81 mg, daily.  Please start taking atorvastatin 40 mg, daily.  You can use Flonase nasal spray as needed.  Testing Required after Discharge -   Zio patch  ** Please contact your PCP/cardiology to request testing orders for any of the testing recommended here **  Important follow up information -   Please follow-up with your PCP within 1 week of discharge.  Please follow-up with outpatient cardiology, if you do not have one, a referral has been placed.  Please follow-up with outpatient neurology within 6 weeks of discharge, if you do not have one, a referral has been placed.  Other Instructions -   Should your symptoms worsen or return, in addition if you have any facial drooping, extremity weakness/tingling, gait abnormalities, speech difficulty, please return to ED for further evaluation.  Please review this entire after  visit summary as additional general instructions including medication list, appointments, activity, diet, any pertinent wound care, and other additional recommendations from your care team that may be provided for you.      Sincerely,     Eugenie Munoz, DO

## 2024-05-24 ENCOUNTER — HOSPITAL ENCOUNTER (INPATIENT)
Facility: HOSPITAL | Age: 59
LOS: 2 days | Discharge: HOME | DRG: 378 | End: 2024-05-26
Attending: EMERGENCY MEDICINE | Admitting: INTERNAL MEDICINE
Payer: COMMERCIAL

## 2024-05-24 ENCOUNTER — APPOINTMENT (EMERGENCY)
Dept: CT IMAGING | Facility: HOSPITAL | Age: 59
DRG: 378 | End: 2024-05-24
Payer: COMMERCIAL

## 2024-05-24 ENCOUNTER — ANESTHESIA EVENT (INPATIENT)
Dept: GASTROENTEROLOGY | Facility: HOSPITAL | Age: 59
DRG: 378 | End: 2024-05-24
Payer: COMMERCIAL

## 2024-05-24 ENCOUNTER — APPOINTMENT (INPATIENT)
Dept: GASTROENTEROLOGY | Facility: HOSPITAL | Age: 59
DRG: 378 | End: 2024-05-24
Payer: COMMERCIAL

## 2024-05-24 ENCOUNTER — ANESTHESIA (INPATIENT)
Dept: GASTROENTEROLOGY | Facility: HOSPITAL | Age: 59
DRG: 378 | End: 2024-05-24
Payer: COMMERCIAL

## 2024-05-24 DIAGNOSIS — E53.8 LOW SERUM VITAMIN B12: ICD-10-CM

## 2024-05-24 DIAGNOSIS — I31.39 PERICARDIAL EFFUSION: ICD-10-CM

## 2024-05-24 DIAGNOSIS — K92.2 GI BLEED: Primary | ICD-10-CM

## 2024-05-24 DIAGNOSIS — R10.9 ABDOMINAL CRAMPING: ICD-10-CM

## 2024-05-24 DIAGNOSIS — J31.0 OTHER RHINITIS: ICD-10-CM

## 2024-05-24 DIAGNOSIS — N40.2 NODULAR PROSTATE: ICD-10-CM

## 2024-05-24 PROBLEM — Z98.84 HISTORY OF GASTRIC BYPASS: Status: ACTIVE | Noted: 2024-05-24

## 2024-05-24 PROBLEM — R73.9 ELEVATED SERUM GLUCOSE: Status: ACTIVE | Noted: 2024-05-24

## 2024-05-24 PROBLEM — G45.9 TIA (TRANSIENT ISCHEMIC ATTACK): Status: ACTIVE | Noted: 2024-05-24

## 2024-05-24 LAB
ABO GROUP BLD: NORMAL
ABO GROUP BLD: NORMAL
ALBUMIN SERPL BCP-MCNC: 3.6 G/DL (ref 3.5–5)
ALP SERPL-CCNC: 75 U/L (ref 34–104)
ALT SERPL W P-5'-P-CCNC: 8 U/L (ref 7–52)
ANION GAP SERPL CALCULATED.3IONS-SCNC: 7 MMOL/L (ref 4–13)
APTT PPP: 27 SECONDS (ref 23–37)
AST SERPL W P-5'-P-CCNC: 14 U/L (ref 13–39)
ATRIAL RATE: 92 BPM
BASOPHILS # BLD AUTO: 0.04 THOUSANDS/ÂΜL (ref 0–0.1)
BASOPHILS NFR BLD AUTO: 1 % (ref 0–1)
BILIRUB SERPL-MCNC: 0.91 MG/DL (ref 0.2–1)
BLD GP AB SCN SERPL QL: NEGATIVE
BUN SERPL-MCNC: 44 MG/DL (ref 5–25)
CALCIUM SERPL-MCNC: 8.3 MG/DL (ref 8.4–10.2)
CHLORIDE SERPL-SCNC: 108 MMOL/L (ref 96–108)
CO2 SERPL-SCNC: 25 MMOL/L (ref 21–32)
CREAT SERPL-MCNC: 1.06 MG/DL (ref 0.6–1.3)
EOSINOPHIL # BLD AUTO: 0.04 THOUSAND/ÂΜL (ref 0–0.61)
EOSINOPHIL NFR BLD AUTO: 1 % (ref 0–6)
ERYTHROCYTE [DISTWIDTH] IN BLOOD BY AUTOMATED COUNT: 14.1 % (ref 11.6–15.1)
FERRITIN SERPL-MCNC: 15 NG/ML (ref 24–336)
GFR SERPL CREATININE-BSD FRML MDRD: 76 ML/MIN/1.73SQ M
GLUCOSE SERPL-MCNC: 155 MG/DL (ref 65–140)
HCT VFR BLD AUTO: 30.3 % (ref 36.5–49.3)
HGB BLD-MCNC: 7.4 G/DL (ref 12–17)
HGB BLD-MCNC: 8.1 G/DL (ref 12–17)
HGB BLD-MCNC: 9.8 G/DL (ref 12–17)
IMM GRANULOCYTES # BLD AUTO: 0.03 THOUSAND/UL (ref 0–0.2)
IMM GRANULOCYTES NFR BLD AUTO: 0 % (ref 0–2)
INR PPP: 1.09 (ref 0.84–1.19)
IRON SATN MFR SERPL: 12 % (ref 15–50)
IRON SERPL-MCNC: 42 UG/DL (ref 50–212)
LIPASE SERPL-CCNC: 10 U/L (ref 11–82)
LYMPHOCYTES # BLD AUTO: 1.53 THOUSANDS/ÂΜL (ref 0.6–4.47)
LYMPHOCYTES NFR BLD AUTO: 18 % (ref 14–44)
MCH RBC QN AUTO: 29 PG (ref 26.8–34.3)
MCHC RBC AUTO-ENTMCNC: 32.3 G/DL (ref 31.4–37.4)
MCV RBC AUTO: 90 FL (ref 82–98)
MONOCYTES # BLD AUTO: 0.52 THOUSAND/ÂΜL (ref 0.17–1.22)
MONOCYTES NFR BLD AUTO: 6 % (ref 4–12)
NEUTROPHILS # BLD AUTO: 6.53 THOUSANDS/ÂΜL (ref 1.85–7.62)
NEUTS SEG NFR BLD AUTO: 74 % (ref 43–75)
NRBC BLD AUTO-RTO: 0 /100 WBCS
P AXIS: 47 DEGREES
PLATELET # BLD AUTO: 238 THOUSANDS/UL (ref 149–390)
PMV BLD AUTO: 11 FL (ref 8.9–12.7)
POTASSIUM SERPL-SCNC: 4.3 MMOL/L (ref 3.5–5.3)
PR INTERVAL: 118 MS
PROT SERPL-MCNC: 5.5 G/DL (ref 6.4–8.4)
PROTHROMBIN TIME: 14.7 SECONDS (ref 11.6–14.5)
QRS AXIS: 60 DEGREES
QRSD INTERVAL: 82 MS
QT INTERVAL: 334 MS
QTC INTERVAL: 413 MS
RBC # BLD AUTO: 3.38 MILLION/UL (ref 3.88–5.62)
RH BLD: POSITIVE
RH BLD: POSITIVE
SODIUM SERPL-SCNC: 140 MMOL/L (ref 135–147)
SPECIMEN EXPIRATION DATE: NORMAL
T WAVE AXIS: 66 DEGREES
TIBC SERPL-MCNC: 341 UG/DL (ref 250–450)
UIBC SERPL-MCNC: 299 UG/DL (ref 155–355)
VENTRICULAR RATE: 92 BPM
WBC # BLD AUTO: 8.69 THOUSAND/UL (ref 4.31–10.16)

## 2024-05-24 PROCEDURE — 86923 COMPATIBILITY TEST ELECTRIC: CPT

## 2024-05-24 PROCEDURE — C9113 INJ PANTOPRAZOLE SODIUM, VIA: HCPCS | Performed by: INTERNAL MEDICINE

## 2024-05-24 PROCEDURE — 85730 THROMBOPLASTIN TIME PARTIAL: CPT

## 2024-05-24 PROCEDURE — 82728 ASSAY OF FERRITIN: CPT | Performed by: NURSE PRACTITIONER

## 2024-05-24 PROCEDURE — 86901 BLOOD TYPING SEROLOGIC RH(D): CPT

## 2024-05-24 PROCEDURE — 99285 EMERGENCY DEPT VISIT HI MDM: CPT | Performed by: EMERGENCY MEDICINE

## 2024-05-24 PROCEDURE — 3E0G8GC INTRODUCTION OF OTHER THERAPEUTIC SUBSTANCE INTO UPPER GI, VIA NATURAL OR ARTIFICIAL OPENING ENDOSCOPIC: ICD-10-PCS | Performed by: INTERNAL MEDICINE

## 2024-05-24 PROCEDURE — 0W3P8ZZ CONTROL BLEEDING IN GASTROINTESTINAL TRACT, VIA NATURAL OR ARTIFICIAL OPENING ENDOSCOPIC: ICD-10-PCS | Performed by: INTERNAL MEDICINE

## 2024-05-24 PROCEDURE — 86900 BLOOD TYPING SEROLOGIC ABO: CPT

## 2024-05-24 PROCEDURE — 43255 EGD CONTROL BLEEDING ANY: CPT | Performed by: INTERNAL MEDICINE

## 2024-05-24 PROCEDURE — 83540 ASSAY OF IRON: CPT | Performed by: NURSE PRACTITIONER

## 2024-05-24 PROCEDURE — 36415 COLL VENOUS BLD VENIPUNCTURE: CPT

## 2024-05-24 PROCEDURE — 85025 COMPLETE CBC W/AUTO DIFF WBC: CPT

## 2024-05-24 PROCEDURE — 96365 THER/PROPH/DIAG IV INF INIT: CPT

## 2024-05-24 PROCEDURE — 83690 ASSAY OF LIPASE: CPT

## 2024-05-24 PROCEDURE — 85018 HEMOGLOBIN: CPT | Performed by: INTERNAL MEDICINE

## 2024-05-24 PROCEDURE — 86850 RBC ANTIBODY SCREEN: CPT

## 2024-05-24 PROCEDURE — 80053 COMPREHEN METABOLIC PANEL: CPT

## 2024-05-24 PROCEDURE — 99223 1ST HOSP IP/OBS HIGH 75: CPT

## 2024-05-24 PROCEDURE — 85610 PROTHROMBIN TIME: CPT

## 2024-05-24 PROCEDURE — 99285 EMERGENCY DEPT VISIT HI MDM: CPT

## 2024-05-24 PROCEDURE — 83550 IRON BINDING TEST: CPT | Performed by: NURSE PRACTITIONER

## 2024-05-24 PROCEDURE — 96376 TX/PRO/DX INJ SAME DRUG ADON: CPT

## 2024-05-24 PROCEDURE — 99223 1ST HOSP IP/OBS HIGH 75: CPT | Performed by: INTERNAL MEDICINE

## 2024-05-24 PROCEDURE — 82746 ASSAY OF FOLIC ACID SERUM: CPT | Performed by: INTERNAL MEDICINE

## 2024-05-24 PROCEDURE — C9113 INJ PANTOPRAZOLE SODIUM, VIA: HCPCS

## 2024-05-24 PROCEDURE — 30233N1 TRANSFUSION OF NONAUTOLOGOUS RED BLOOD CELLS INTO PERIPHERAL VEIN, PERCUTANEOUS APPROACH: ICD-10-PCS | Performed by: INTERNAL MEDICINE

## 2024-05-24 PROCEDURE — 96366 THER/PROPH/DIAG IV INF ADDON: CPT

## 2024-05-24 PROCEDURE — 74178 CT ABD&PLV WO CNTR FLWD CNTR: CPT

## 2024-05-24 PROCEDURE — 93005 ELECTROCARDIOGRAM TRACING: CPT

## 2024-05-24 RX ORDER — FLUTICASONE PROPIONATE 50 MCG
1 SPRAY, SUSPENSION (ML) NASAL DAILY
Status: DISCONTINUED | OUTPATIENT
Start: 2024-05-24 | End: 2024-05-24

## 2024-05-24 RX ORDER — SODIUM CHLORIDE 9 MG/ML
100 INJECTION, SOLUTION INTRAVENOUS CONTINUOUS
Status: DISCONTINUED | OUTPATIENT
Start: 2024-05-24 | End: 2024-05-25

## 2024-05-24 RX ORDER — HYDROMORPHONE HYDROCHLORIDE 2 MG/ML
0.5 INJECTION, SOLUTION INTRAMUSCULAR; INTRAVENOUS; SUBCUTANEOUS ONCE
Status: COMPLETED | OUTPATIENT
Start: 2024-05-24 | End: 2024-05-24

## 2024-05-24 RX ORDER — EPINEPHRINE 0.1 MG/ML
INJECTION INTRAVENOUS AS NEEDED
Status: COMPLETED | OUTPATIENT
Start: 2024-05-24 | End: 2024-05-24

## 2024-05-24 RX ORDER — LORATADINE 10 MG/1
10 TABLET ORAL DAILY
Status: DISCONTINUED | OUTPATIENT
Start: 2024-05-24 | End: 2024-05-26 | Stop reason: HOSPADM

## 2024-05-24 RX ORDER — PROPOFOL 10 MG/ML
INJECTION, EMULSION INTRAVENOUS AS NEEDED
Status: DISCONTINUED | OUTPATIENT
Start: 2024-05-24 | End: 2024-05-24

## 2024-05-24 RX ORDER — LIDOCAINE HYDROCHLORIDE 10 MG/ML
INJECTION, SOLUTION EPIDURAL; INFILTRATION; INTRACAUDAL; PERINEURAL AS NEEDED
Status: DISCONTINUED | OUTPATIENT
Start: 2024-05-24 | End: 2024-05-24

## 2024-05-24 RX ORDER — SODIUM CHLORIDE, SODIUM LACTATE, POTASSIUM CHLORIDE, CALCIUM CHLORIDE 600; 310; 30; 20 MG/100ML; MG/100ML; MG/100ML; MG/100ML
INJECTION, SOLUTION INTRAVENOUS CONTINUOUS PRN
Status: DISCONTINUED | OUTPATIENT
Start: 2024-05-24 | End: 2024-05-24

## 2024-05-24 RX ORDER — FLUTICASONE PROPIONATE 50 MCG
2 SPRAY, SUSPENSION (ML) NASAL DAILY
Status: DISCONTINUED | OUTPATIENT
Start: 2024-05-25 | End: 2024-05-26 | Stop reason: HOSPADM

## 2024-05-24 RX ORDER — ATORVASTATIN CALCIUM 40 MG/1
40 TABLET, FILM COATED ORAL EVERY EVENING
Status: DISCONTINUED | OUTPATIENT
Start: 2024-05-24 | End: 2024-05-26 | Stop reason: HOSPADM

## 2024-05-24 RX ORDER — LORATADINE 10 MG/1
10 TABLET ORAL DAILY
Status: DISCONTINUED | OUTPATIENT
Start: 2024-05-25 | End: 2024-05-24

## 2024-05-24 RX ADMIN — IOHEXOL 100 ML: 350 INJECTION, SOLUTION INTRAVENOUS at 04:43

## 2024-05-24 RX ADMIN — LORATADINE 10 MG: 10 TABLET ORAL at 17:22

## 2024-05-24 RX ADMIN — PROPOFOL 50 MG: 10 INJECTION, EMULSION INTRAVENOUS at 12:05

## 2024-05-24 RX ADMIN — SODIUM CHLORIDE 8 MG/HR: 9 INJECTION, SOLUTION INTRAVENOUS at 05:18

## 2024-05-24 RX ADMIN — SODIUM CHLORIDE 80 MG: 9 INJECTION, SOLUTION INTRAVENOUS at 04:50

## 2024-05-24 RX ADMIN — ATORVASTATIN CALCIUM 40 MG: 40 TABLET, FILM COATED ORAL at 17:22

## 2024-05-24 RX ADMIN — HYDROMORPHONE HYDROCHLORIDE 0.5 MG: 2 INJECTION INTRAMUSCULAR; INTRAVENOUS; SUBCUTANEOUS at 12:40

## 2024-05-24 RX ADMIN — EPINEPHRINE 2.5 MCG: 0.1 INJECTION INTRAVENOUS at 12:07

## 2024-05-24 RX ADMIN — SODIUM CHLORIDE, SODIUM LACTATE, POTASSIUM CHLORIDE, AND CALCIUM CHLORIDE: .6; .31; .03; .02 INJECTION, SOLUTION INTRAVENOUS at 11:54

## 2024-05-24 RX ADMIN — LIDOCAINE HYDROCHLORIDE 50 MG: 10 INJECTION, SOLUTION EPIDURAL; INFILTRATION; INTRACAUDAL; PERINEURAL at 11:58

## 2024-05-24 RX ADMIN — SODIUM CHLORIDE 1000 ML: 0.9 INJECTION, SOLUTION INTRAVENOUS at 04:26

## 2024-05-24 RX ADMIN — SODIUM CHLORIDE 100 ML/HR: 0.9 INJECTION, SOLUTION INTRAVENOUS at 15:51

## 2024-05-24 RX ADMIN — PROPOFOL 140 MG: 10 INJECTION, EMULSION INTRAVENOUS at 11:58

## 2024-05-24 RX ADMIN — FLUTICASONE PROPIONATE 1 SPRAY: 50 SPRAY, METERED NASAL at 09:46

## 2024-05-24 RX ADMIN — SODIUM CHLORIDE 8 MG/HR: 9 INJECTION, SOLUTION INTRAVENOUS at 15:35

## 2024-05-24 RX ADMIN — CYANOCOBALAMIN TAB 500 MCG 1000 MCG: 500 TAB at 17:22

## 2024-05-24 RX ADMIN — SODIUM CHLORIDE 100 ML/HR: 0.9 INJECTION, SOLUTION INTRAVENOUS at 08:14

## 2024-05-24 RX ADMIN — SODIUM CHLORIDE 8 MG/HR: 9 INJECTION, SOLUTION INTRAVENOUS at 23:12

## 2024-05-24 RX ADMIN — PROPOFOL 50 MG: 10 INJECTION, EMULSION INTRAVENOUS at 12:02

## 2024-05-24 NOTE — PLAN OF CARE

## 2024-05-24 NOTE — ED NOTES
Pt up oob to bathroom with assist x 1. Pt noted with dizziness and fatigue upon returning to room. No c/o pain at time + blood blood in stool and blood drops on floor noted vss.     Lani Diallo RN  05/24/24 3488

## 2024-05-24 NOTE — ANESTHESIA POSTPROCEDURE EVALUATION
Post-Op Assessment Note    CV Status:  Stable    Pain management: adequate       Mental Status:  Arousable   Hydration Status:  Euvolemic   PONV Controlled:  Controlled   Airway Patency:  Patent     Post Op Vitals Reviewed: Yes    No anethesia notable event occurred.    Staff: CRNA           BP      Temp      Pulse     Resp      SpO2

## 2024-05-24 NOTE — Clinical Note
Case was discussed with AICHA POTTER and the patient's admission status was agreed to be Admission Status: observation status to the service of Dr. Villarreal

## 2024-05-24 NOTE — ASSESSMENT & PLAN NOTE
CT A/P: Small pericardial effussion < 7mm  ECHO from 05/09/2024: LVEF 65%, mild concentric hypertrophy.  Continue to monitor

## 2024-05-24 NOTE — H&P
"[facility]  H&P  Name: Jay Smith 59 y.o. male I MRN: [id number]  Unit/Bed#: ED-36 I Date of Admission: 5/24/2024   Date of Service: 5/24/2024 I Hospital Day: 0      Assessment & Plan   * GI bleed  Assessment & Plan  Presentation: Patient presents with complaints of intermittent, lower abdominal cramping with 3 bowel movements he describes as dark red.  He then had an additional 2 episodes with larger amounts of blood in stool prompting him to present to the ED for further evaluation. He reports associated nausea without vomiting and dizziness. Of note, patient was recently started on 81 mg of Aspirin during hospitalization for TIA workup.  Currently patient is hemodynamically stable.  Hemoglobin: 9.8 from 13.7 on 5/09/24  Monitor H&H Q 6 hours   CT scan: \"No CT evidence of active high-volume gastrointestinal hemorrhage.Prior gastric bypass. Small hiatal hernia. Colonic diverticulosis without evidence of acute diverticulitis. Normal appendix. No bowel obstruction. The prostate is somewhat prominent and causes some mild nodular appearing indentation upon the base of the urinary bladder.\"  Possible upper gi bleed from ASA/NSAID vs PUD  Appreciate GI recommendations     Plan:  Keep NPO for possible EGD/colonoscopy  Stool record at bedside.  Initiate on IV fluids.  Continue IV Protonix infusion.  Hold ASA/avoid further NSAIDs  Transfuse if hemoglobin < 7.  Iron panel pending.  Consult Gastroenterology.    History of gastric bypass  Assessment & Plan  Noted in history.    Pericardial effusion  Assessment & Plan  CT A/P: Small pericardial effussion < 7mm  ECHO from 05/09/2024: LVEF 65%, mild concentric hypertrophy.  Continue to monitor     Elevated serum glucose  Assessment & Plan  POA, serum glucose of 155  Recent A1C 5.4 on 05/04/2024.    TIA (transient ischemic attack)  Assessment & Plan  Recently hospitalization from 05/08/2024-05/09/2024 due to complaints of confusion and difficulty " recalling things at which time etiology was suspected to be likely TIA versus hypertensive urgency versus atypical migraine. Patient was discharged home on Aspirin 81 mg daily and Atorvastatin 40 mg daily.   Hold ASA at this time due to current bloody bowel movements.    HTN (hypertension)  Assessment & Plan  Blood pressure is reviewed and acceptable.  Last recorded pressure: 117/61  Patient is not currently on any antihypertensive medications.  Monitor blood pressure.           VTE Pharmacologic Prophylaxis: VTE Score: 2 Low Risk (Score 0-2) - Encourage Ambulation.  Code Status: Level 1 - Full Code prior  Discussion with family: Patient declined call to .     Anticipated Length of Stay: Patient will be admitted on an inpatient basis with an anticipated length of stay of greater than 2 midnights secondary to GI bleed.    Total Time Spent on Date of Encounter in care of patient: 70 mins. This time was spent on one or more of the following: performing physical exam; counseling and coordination of care; obtaining or reviewing history; documenting in the medical record; reviewing/ordering tests, medications or procedures; communicating with other healthcare professionals and discussing with patient's family/caregivers.    Chief Complaint: Abdominal cramping, blood in stool    History of Present Illness:  Jay Smith is a 59 y.o. male with a PMH of HTN and s/p gastric bypass who presents with complaints of intermittent, lower abdominal cramping since Monday. This pain was worse with standing and relieved with sitting. He reports this has since resolved. He states that last night he began to have bloody bowel movements. He states he had 3 bowel movements with dark, red blood and minimal stool since yesterday. He then had an additional 2 episodes with larger amounts of blood in stool prompting him to present to the ED for further evaluation. He reports associated nausea without vomiting, dizziness, and  fatigue . Of note, patient was recently started on 81 mg of Aspirin during hospitalization for TIA workup. He has been taking OTC excedrin twice daily  for the past 2 days. Denies any frequent alcohol use, tobacco use, or any recent changes in diet.     Upon evaluation in the ED, patient was found to have hemoglobin of 9.8, with prior hemoglobin being 13.7 on discharge, 05/09/2024. Patient was subsequently starting of IV Protonix infusion. Patient will be admitted for serial hemoglobin checks and GI consult.    Review of Systems:  Review of Systems   Constitutional:  Negative for chills and fever.   Respiratory:  Negative for cough, choking, chest tightness and shortness of breath.    Cardiovascular:  Negative for chest pain.   Gastrointestinal:  Positive for abdominal pain, blood in stool and nausea. Negative for vomiting.   Genitourinary:  Negative for decreased urine volume, difficulty urinating, dysuria, flank pain and hematuria.   Neurological:  Positive for dizziness, light-headedness and headaches. Negative for tremors, facial asymmetry, speech difficulty, weakness and numbness.   Psychiatric/Behavioral: Negative.     All other systems reviewed and are negative.      Past Medical and Surgical History:   Past Medical History:   Diagnosis Date    Hypertension        Past Surgical History:   Procedure Laterality Date    BACK SURGERY      GASTRIC BYPASS      KNEE SURGERY      SHOULDER SURGERY         Meds/Allergies:  Prior to Admission medications    Medication Sig Start Date End Date Taking? Authorizing Provider   aspirin 81 mg chewable tablet Chew 1 tablet (81 mg total) daily 5/10/24 6/9/24  Eugenie Munoz DO   atorvastatin (LIPITOR) 40 mg tablet Take 1 tablet (40 mg total) by mouth every evening 5/9/24 6/8/24  Eugenie Munoz DO   fluticasone (FLONASE) 50 mcg/act nasal spray 1 spray into each nostril daily 5/9/24 6/8/24  Eugenie Munoz DO     I have reviewed home medications with patient  personally.    Allergies: No Known Allergies    Social History:  Marital Status: /Civil Union   Occupation: Unknown  Patient Pre-hospital Living Situation: Home  Patient Pre-hospital Level of Mobility: walks  Patient Pre-hospital Diet Restrictions: None  Substance Use History:   Social History     Substance and Sexual Activity   Alcohol Use Not Currently     Social History     Tobacco Use   Smoking Status Never    Passive exposure: Never   Smokeless Tobacco Never     Social History     Substance and Sexual Activity   Drug Use Never       Family History:  Family History   Problem Relation Age of Onset    Heart attack Father     Diabetes Father        Physical Exam:     Vitals:   Blood Pressure: 117/61 (05/24/24 0522)  Pulse: 82 (05/24/24 0522)  Temperature: 97.9 °F (36.6 °C) (05/24/24 0420)  Temp Source: Oral (05/24/24 0420)  Respirations: 18 (05/24/24 0413)  Weight - Scale: 99 kg (218 lb 4.1 oz) (05/24/24 0420)  SpO2: 98 % (05/24/24 0430)    Physical Exam  Vitals and nursing note reviewed.   Constitutional:       General: He is not in acute distress.     Appearance: Normal appearance. He is well-developed. He is not ill-appearing or diaphoretic.   HENT:      Head: Normocephalic and atraumatic.      Mouth/Throat:      Mouth: Mucous membranes are dry.   Eyes:      Conjunctiva/sclera: Conjunctivae normal.   Cardiovascular:      Rate and Rhythm: Normal rate and regular rhythm.      Heart sounds: No murmur heard.  Pulmonary:      Effort: Pulmonary effort is normal. No respiratory distress.      Breath sounds: Normal breath sounds.   Abdominal:      General: Bowel sounds are normal. There is no distension.      Palpations: Abdomen is soft.      Tenderness: There is no abdominal tenderness. There is no guarding.   Musculoskeletal:         General: No swelling.      Cervical back: Neck supple.      Right lower leg: No edema.      Left lower leg: No edema.   Skin:     General: Skin is warm and dry.      Capillary  Refill: Capillary refill takes less than 2 seconds.   Neurological:      General: No focal deficit present.      Mental Status: He is alert and oriented to person, place, and time.   Psychiatric:         Mood and Affect: Mood normal.          Additional Data:     Lab Results:  Results from last 7 days   Lab Units 05/24/24  0426   WBC Thousand/uL 8.69   HEMOGLOBIN g/dL 9.8*   HEMATOCRIT % 30.3*   PLATELETS Thousands/uL 238   SEGS PCT % 74   LYMPHO PCT % 18   MONO PCT % 6   EOS PCT % 1     Results from last 7 days   Lab Units 05/24/24  0426   SODIUM mmol/L 140   POTASSIUM mmol/L 4.3   CHLORIDE mmol/L 108   CO2 mmol/L 25   BUN mg/dL 44*   CREATININE mg/dL 1.06   ANION GAP mmol/L 7   CALCIUM mg/dL 8.3*   ALBUMIN g/dL 3.6   TOTAL BILIRUBIN mg/dL 0.91   ALK PHOS U/L 75   ALT U/L 8   AST U/L 14   GLUCOSE RANDOM mg/dL 155*     Results from last 7 days   Lab Units 05/24/24  0426   INR  1.09                   Lines/Drains:  Invasive Devices       Peripheral Intravenous Line  Duration             Peripheral IV 05/24/24 Left;Proximal;Ventral (anterior) Forearm <1 day                        Imaging: Reviewed radiology reports from this admission including: abdominal/pelvic CT  CT high volume bleeding scan abdomen pelvis   Final Result by Lia Soriano MD (05/24 0623)      No CT evidence of active high-volume gastrointestinal hemorrhage.      Prior gastric bypass. Small hiatal hernia. Colonic diverticulosis without evidence of acute diverticulitis. Normal appendix. No bowel obstruction.      The prostate is somewhat prominent and causes some mild nodular appearing indentation upon the base of the urinary bladder. Clinical and laboratory (PSA) correlation recommended.      Small pericardial effusion.      Other nonemergent findings as above.      The study was marked in EPIC for immediate notification.      Workstation performed: [id number]             EKG and Other Studies Reviewed on Admission:   EKG:  SR with SA, heart  rate 92.    ** Please Note: This note has been constructed using a voice recognition system. **

## 2024-05-24 NOTE — ANESTHESIA PREPROCEDURE EVALUATION
Procedure:  EGD    Relevant Problems   CARDIO   (+) HTN (hypertension)      GI/HEPATIC   (+) GI bleed      NEURO/PSYCH   (+) TIA (transient ischemic attack)     Hemoglobin    8.1 Low          Left Ventricle: Left ventricular cavity size is normal. Wall thickness  is mildly increased. There is mild concentric hypertrophy. The left  ventricular ejection fraction is 65%. Systolic function is normal. Wall  motion is normal. Diastolic function is normal.    Atrial Septum: No patent foramen ovale detected, confirmed at rest  using agitated saline contrast, confirmed by provocation with cough, using  agitated saline contrast.    Physical Exam    Airway    Mallampati score: II  TM Distance: >3 FB  Neck ROM: full     Dental       Cardiovascular      Pulmonary      Other Findings        Anesthesia Plan  ASA Score- 2     Anesthesia Type- IV sedation with anesthesia with ASA Monitors.         Additional Monitors:     Airway Plan:            Plan Factors-    Chart reviewed.                      Induction- intravenous.    Postoperative Plan-     Perioperative Resuscitation Plan - Level 1 - Full Code.       Informed Consent- Anesthetic plan and risks discussed with patient.  I personally reviewed this patient with the CRNA. Discussed and agreed on the Anesthesia Plan with the CRNA..

## 2024-05-24 NOTE — ASSESSMENT & PLAN NOTE
Recently hospitalization from 05/08/2024-05/09/2024 due to complaints of confusion and difficulty recalling things at which time etiology was suspected to be likely TIA versus hypertensive urgency versus atypical migraine. Patient was discharged home on Aspirin 81 mg daily and Atorvastatin 40 mg daily.   Hold ASA at this time due to current bloody bowel movements.

## 2024-05-24 NOTE — ASSESSMENT & PLAN NOTE
Blood pressure is reviewed and acceptable.  Last recorded pressure: 117/61  Patient is not currently on any antihypertensive medications.  Monitor blood pressure.

## 2024-05-24 NOTE — ASSESSMENT & PLAN NOTE
"Presentation: Patient presents with complaints of intermittent, lower abdominal cramping with 3 bowel movements he describes as dark red.  He then had an additional 2 episodes with larger amounts of blood in stool prompting him to present to the ED for further evaluation. He reports associated nausea without vomiting and dizziness. Of note, patient was recently started on 81 mg of Aspirin during hospitalization for TIA workup.  Currently patient is hemodynamically stable.  Hemoglobin: 9.8 from 13.7 on 5/09/24  Monitor H&H Q 6 hours   CT scan: \"No CT evidence of active high-volume gastrointestinal hemorrhage.Prior gastric bypass. Small hiatal hernia. Colonic diverticulosis without evidence of acute diverticulitis. Normal appendix. No bowel obstruction. The prostate is somewhat prominent and causes some mild nodular appearing indentation upon the base of the urinary bladder.\"  Possible upper gi bleed from ASA/NSAID vs PUD  Appreciate GI recommendations     Plan:  Keep NPO for possible EGD/colonoscopy  Stool record at bedside.  Initiate on IV fluids.  Continue IV Protonix infusion.  Hold ASA/avoid further NSAIDs  Transfuse if hemoglobin < 7.  Iron panel pending.  Consult Gastroenterology.  "

## 2024-05-24 NOTE — CONSULTS
Consultation -  [facility]  Jay Smith 59 y.o. male MRN: [id number]  Unit/Bed#: ED-36 Encounter: [id number]    ASSESSMENT/PLAN:     #1.  Acute onset of rectal bleeding, elevated BUN in the setting of gastric bypass history and chronic NSAID use with recent addition of 81 mg aspirin, highly suspect upper GI bleed from anastomotic ulcer, less likely lower GI source    -Keep patient n.p.o.    -Protonix drip    -Monitor hemoglobin closely, patient reports symptoms of anemia at this point and has had more bleeding episodes since coming to hospital, recheck hemoglobin at this time, also messaged Slim, recommend preparing PRBCs for potential transfusion, he has been typed and screened already    -Plan for EGD    -EGD procedure was explained in detail to the patient at this time including associated risks and benefits, risks including but not limited to infection, perforation and bleeding    -If EGD for any reason is unrevealing for sources of GI bleeding, then would recommend prepping patient for colonoscopy the following day    -I also advised patient about the importance of NSAID avoidance particularly in view of his gastric bypass history; if he is having frequent headaches he should discuss this with his other doctors to delineate a treatment regimen that does not involve the use of NSAIDs    -Advised patient to let us know if he develops nausea, can order Reglan if needed      2.  Colon cancer screening; patient has never had any form of colon cancer screening by the age of 59; rule out adenomatous polyps or malignancy    -Advised patient that we should pursue colonoscopy as outpatient        Inpatient consult to gastroenterology  Consult performed by: Jojo Chowdhury PA-C  Consult ordered by: FATUMA Banks          Reason for Consult / Principal Problem: GI bleed    HPI: Jay Smith is a 59 y.o. year old male with history of Frank-en-Y gastric bypass in 2015, cholecystectomy who  presented to the emergency room early this morning; the patient said that he was feeling weak when he woke up and went to the bathroom and had a bowel movement consisting of dark purple liquid.  He says he had multiple such episodes, since coming to the hospital he also had some bleeding, his last bowel movement was 2 hours ago which he says still consisted of red blood.  He says he feels generally weak and has some dizziness and lightheadedness.  He denies shortness of breath.  Denies chest pain or abdominal pain.  He denies any vomiting, had some nausea toward the beginning of his symptomatology but not presently.    He has not had EGD since his gastric bypass in 2015.  His BUN is elevated at 44 with creatinine 1.06, hemoglobin is 9.8 compared with 13.7 just 3 weeks ago.  Of relevance he was seen in the hospital 3 weeks ago for suspected TIA and was started on 81 mg aspirin at that time.  The patient says he also has been taking Excedrin few times a week for migraines.  He denies tobacco or alcohol use.    Patient says he has also not had a colonoscopy or any form of colon cancer screening, no known family history of colon cancer.    REVIEW OF SYSTEMS:    CONSTITUTIONAL: Denies any fever, chills, or rigors. Good appetite, and no recent weight loss.  HEENT: No earache or tinnitus. Denies hearing loss or visual disturbances.  CARDIOVASCULAR: No chest pain or palpitations.   RESPIRATORY: Denies any cough, hemoptysis, shortness of breath or dyspnea on exertion.  GASTROINTESTINAL: As noted in the History of Present Illness.   GENITOURINARY: No problems with urination. Denies any hematuria or dysuria.  NEUROLOGIC: No dizziness or vertigo, denies headaches.   MUSCULOSKELETAL: Denies any muscle or joint pain.   SKIN: Denies skin rashes or itching.   ENDOCRINE: Denies excessive thirst. Denies intolerance to heat or cold.  PSYCHOSOCIAL: Denies depression or anxiety. Denies any recent memory loss.       Historical  Information   Past Medical History:   Diagnosis Date    Hypertension      Past Surgical History:   Procedure Laterality Date    BACK SURGERY      GASTRIC BYPASS      KNEE SURGERY      SHOULDER SURGERY       Social History   Social History     Substance and Sexual Activity   Alcohol Use Not Currently     Social History     Substance and Sexual Activity   Drug Use Never     Social History     Tobacco Use   Smoking Status Never    Passive exposure: Never   Smokeless Tobacco Never     Family History   Problem Relation Age of Onset    Heart attack Father     Diabetes Father        Meds/Allergies     Not in a hospital admission.  Current Facility-Administered Medications   Medication Dose Route Frequency    atorvastatin (LIPITOR) tablet 40 mg  40 mg Oral QPM    fluticasone (FLONASE) 50 mcg/act nasal spray 1 spray  1 spray Nasal Daily    pantoprazole (PROTONIX) 80 mg in sodium chloride 0.9 % 100 mL infusion  8 mg/hr Intravenous Continuous    sodium chloride 0.9 % infusion  100 mL/hr Intravenous Continuous       No Known Allergies        Objective     Blood pressure 101/59, pulse 88, temperature 97.9 °F (36.6 °C), temperature source Oral, resp. rate 18, weight 99 kg (218 lb 4.1 oz), SpO2 95%.      Intake/Output Summary (Last 24 hours) at 5/24/2024 0907  Last data filed at 5/24/2024 0526  Gross per 24 hour   Intake 1100 ml   Output --   Net 1100 ml         PHYSICAL EXAM     General Appearance:   Alert, cooperative, no distress, appears stated age    HEENT:   Normocephalic, atraumatic, anicteric.     Neck:  Supple, symmetrical, trachea midline, no adenopathy;    thyroid: no enlargement/tenderness/nodules; no carotid  bruit or JVD    Lungs:   Clear to auscultation bilaterally; no rales, rhonchi or wheezing; respirations unlabored    Heart::   S1 and S2 normal; regular rate and rhythm; no murmur, rub, or gallop.   Abdomen:   Soft, non-tender, non-distended; normal bowel sounds; no masses, no organomegaly    Genitalia:    Deferred    Rectal:   Deferred    Extremities:  No cyanosis, clubbing or edema    Pulses:  2+ and symmetric all extremities    Skin:  Skin color, texture, turgor normal, no rashes or lesions    Lymph nodes:  No palpable cervical, axillary or inguinal lymphadenopathy        Lab Results:   Admission on 05/24/2024   Component Date Value    Ventricular Rate 05/24/2024 92     Atrial Rate 05/24/2024 92     NJ Interval 05/24/2024 118     QRSD Interval 05/24/2024 82     QT Interval 05/24/2024 334     QTC Interval 05/24/2024 413     P Axis 05/24/2024 47     QRS Axis 05/24/2024 60     T Wave [place] 05/24/2024 66     WBC 05/24/2024 8.69     RBC 05/24/2024 3.38 (L)     Hemoglobin 05/24/2024 9.8 (L)     Hematocrit 05/24/2024 30.3 (L)     MCV 05/24/2024 90     MCH 05/24/2024 29.0     MCHC 05/24/2024 32.3     RDW 05/24/2024 14.1     MPV 05/24/2024 11.0     Platelets 05/24/2024 238     nRBC 05/24/2024 0     Segmented % 05/24/2024 74     Immature Grans % 05/24/2024 0     Lymphocytes % 05/24/2024 18     Monocytes % 05/24/2024 6     Eosinophils Relative 05/24/2024 1     Basophils Relative 05/24/2024 1     Absolute Neutrophils 05/24/2024 6.53     Absolute Immature Grans 05/24/2024 0.03     Absolute Lymphocytes 05/24/2024 1.53     Absolute Monocytes 05/24/2024 0.52     Eosinophils Absolute 05/24/2024 0.04     Basophils Absolute 05/24/2024 0.04     Sodium 05/24/2024 140     Potassium 05/24/2024 4.3     Chloride 05/24/2024 108     CO2 05/24/2024 25     ANION GAP 05/24/2024 7     BUN 05/24/2024 44 (H)     Creatinine 05/24/2024 1.06     Glucose 05/24/2024 155 (H)     Calcium 05/24/2024 8.3 (L)     AST 05/24/2024 14     ALT 05/24/2024 8     Alkaline Phosphatase 05/24/2024 75     Total Protein 05/24/2024 5.5 (L)     Albumin 05/24/2024 3.6     Total Bilirubin 05/24/2024 0.91     eGFR 05/24/2024 76     Lipase 05/24/2024 10 (L)     Protime 05/24/2024 14.7 (H)     INR 05/24/2024 1.09     PTT 05/24/2024 27     ABO Grouping 05/24/2024 B     Rh Factor  05/24/2024 Positive     Antibody Screen 05/24/2024 Negative     Specimen Expiration Date 05/24/2024 [id number]        Imaging Studies: I have personally reviewed pertinent reports.      CT ABDOMEN AND PELVIS - WITHOUT AND WITH IV CONTRAST     INDICATION:   Intermittent lower abdominal cramping, blood per rectum. Lower abdominal cramping since yesterday, bloody diarrhea this morning. Mild nausea. Patient recently put on aspirin. Decreased hemoglobin and hematocrit (presently 9.8/30.3, was   13.7/41.3 on May 9, 2024)     COMPARISON: April 8, 2019.     TECHNIQUE: CT examination of the abdomen and pelvis was performed both prior to and after the administration of intravenous contrast.  Contrast was injected one time intravenously without immediate complication.  Scanning through the abdomen and pelvis   was performed in arterial and portal venous phases according a protocol specifically designed to evaluate for active high-volume gastrointestinal hemorrhage. Multiplanar 2D reformatted images were created from the source data.     Radiation dose length product (DLP) for this visit: 2730 mGy-cm . This examination, like all CT scans performed in the [facility], was performed utilizing techniques to minimize radiation dose exposure, including the use of iterative   reconstruction and automated exposure control.     IV Contrast: 100 mL of iohexol (OMNIPAQUE)  Enteric Contrast: Not administered.     FINDINGS:     ABDOMEN     BOWEL:No active extravasation of intravenous contrast into the lumen of stomach, small bowel, or large bowel loops.     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 The celiac, SMA, CARLITO and renal arteries are patent.     Prior gastric bypass. Small hiatal hernia. Colonic diverticulosis without evidence of acute diverticulitis. No bowel obstruction.        LOWER CHEST: Small hiatal hernia. There is a small pericardial effusion, measuring  up to 7 mm anteriorly.     LIVER/BILIARY TREE: Unremarkable.     GALLBLADDER: Post cholecystectomy.     SPLEEN: Unremarkable.     PANCREAS: Unremarkable.     ADRENAL GLANDS: Left adrenal fullness appears similar to the prior study.     KIDNEYS/URETERS: Bilateral nonobstructing renal calculi. No hydronephrosis bilaterally.     APPENDIX: Normal.     ABDOMINOPELVIC CAVITY: No ascites. No pneumoperitoneum. No lymphadenopathy.     VESSELS: As described above. Atherosclerosis. No abdominal aortic aneurysm     PELVIS     REPRODUCTIVE ORGANS: The prostate is somewhat prominent and causes some mild nodular appearing indentation upon the base of the urinary bladder.     URINARY BLADDER: Unremarkable.     ABDOMINAL WALL/INGUINAL REGIONS: Tiny fat-containing umbilical hernia.     BONES: No acute fracture or suspicious osseous lesion. Spinal degenerative changes. Prior posterior decompression L4-5.        IMPRESSION:     No CT evidence of active high-volume gastrointestinal hemorrhage.     Prior gastric bypass. Small hiatal hernia. Colonic diverticulosis without evidence of acute diverticulitis. Normal appendix. No bowel obstruction.     The prostate is somewhat prominent and causes some mild nodular appearing indentation upon the base of the urinary bladder. Clinical and laboratory (PSA) correlation recommended.     Small pericardial effusion.     Other nonemergent findings as above.     The study was marked in EPIC for immediate notification.            The patient was seen and examined by Dr. España, all cooper medical decisions were made with Dr. España.  Thank you for allowing us to participate in the care of this pleasant patient.  We will follow up with you closely.

## 2024-05-25 PROBLEM — D62 ACUTE BLOOD LOSS ANEMIA: Status: ACTIVE | Noted: 2024-05-25

## 2024-05-25 LAB
ABO GROUP BLD BPU: NORMAL
ANION GAP SERPL CALCULATED.3IONS-SCNC: 2 MMOL/L (ref 4–13)
ANISOCYTOSIS BLD QL SMEAR: PRESENT
BASOPHILS # BLD AUTO: 0.04 THOUSANDS/ÂΜL (ref 0–0.1)
BASOPHILS NFR BLD AUTO: 1 % (ref 0–1)
BPU ID: NORMAL
BUN SERPL-MCNC: 26 MG/DL (ref 5–25)
CALCIUM SERPL-MCNC: 7.7 MG/DL (ref 8.4–10.2)
CHLORIDE SERPL-SCNC: 114 MMOL/L (ref 96–108)
CO2 SERPL-SCNC: 25 MMOL/L (ref 21–32)
CREAT SERPL-MCNC: 0.92 MG/DL (ref 0.6–1.3)
CROSSMATCH: NORMAL
EOSINOPHIL # BLD AUTO: 0.04 THOUSAND/ÂΜL (ref 0–0.61)
EOSINOPHIL NFR BLD AUTO: 1 % (ref 0–6)
ERYTHROCYTE [DISTWIDTH] IN BLOOD BY AUTOMATED COUNT: 14.7 % (ref 11.6–15.1)
FERRITIN SERPL-MCNC: 5 NG/ML (ref 24–336)
FOLATE SERPL-MCNC: 11.1 NG/ML
GFR SERPL CREATININE-BSD FRML MDRD: 91 ML/MIN/1.73SQ M
GLUCOSE SERPL-MCNC: 105 MG/DL (ref 65–140)
HCT VFR BLD AUTO: 20.8 % (ref 36.5–49.3)
HGB BLD-MCNC: 6.5 G/DL (ref 12–17)
HGB BLD-MCNC: 6.6 G/DL (ref 12–17)
HGB BLD-MCNC: 7.4 G/DL (ref 12–17)
IMM GRANULOCYTES # BLD AUTO: 0.02 THOUSAND/UL (ref 0–0.2)
IMM GRANULOCYTES NFR BLD AUTO: 0 % (ref 0–2)
IRON SATN MFR SERPL: 6 % (ref 15–50)
IRON SERPL-MCNC: 18 UG/DL (ref 50–212)
LYMPHOCYTES # BLD AUTO: 1.06 THOUSANDS/ÂΜL (ref 0.6–4.47)
LYMPHOCYTES NFR BLD AUTO: 23 % (ref 14–44)
MCH RBC QN AUTO: 28.8 PG (ref 26.8–34.3)
MCHC RBC AUTO-ENTMCNC: 31.7 G/DL (ref 31.4–37.4)
MCV RBC AUTO: 91 FL (ref 82–98)
MONOCYTES # BLD AUTO: 0.28 THOUSAND/ÂΜL (ref 0.17–1.22)
MONOCYTES NFR BLD AUTO: 6 % (ref 4–12)
NEUTROPHILS # BLD AUTO: 3.28 THOUSANDS/ÂΜL (ref 1.85–7.62)
NEUTS SEG NFR BLD AUTO: 69 % (ref 43–75)
NRBC BLD AUTO-RTO: 0 /100 WBCS
PLATELET # BLD AUTO: 142 THOUSANDS/UL (ref 149–390)
PLATELET BLD QL SMEAR: ADEQUATE
PMV BLD AUTO: 11.1 FL (ref 8.9–12.7)
POTASSIUM SERPL-SCNC: 4.1 MMOL/L (ref 3.5–5.3)
RBC # BLD AUTO: 2.29 MILLION/UL (ref 3.88–5.62)
RBC MORPH BLD: PRESENT
SODIUM SERPL-SCNC: 141 MMOL/L (ref 135–147)
TIBC SERPL-MCNC: 284 UG/DL (ref 250–450)
UIBC SERPL-MCNC: 266 UG/DL (ref 155–355)
UNIT DISPENSE STATUS: NORMAL
UNIT PRODUCT CODE: NORMAL
UNIT PRODUCT VOLUME: 350 ML
UNIT RH: NORMAL
WBC # BLD AUTO: 4.72 THOUSAND/UL (ref 4.31–10.16)

## 2024-05-25 PROCEDURE — 85018 HEMOGLOBIN: CPT | Performed by: INTERNAL MEDICINE

## 2024-05-25 PROCEDURE — 99233 SBSQ HOSP IP/OBS HIGH 50: CPT | Performed by: INTERNAL MEDICINE

## 2024-05-25 PROCEDURE — 83540 ASSAY OF IRON: CPT | Performed by: INTERNAL MEDICINE

## 2024-05-25 PROCEDURE — P9016 RBC LEUKOCYTES REDUCED: HCPCS

## 2024-05-25 PROCEDURE — 85025 COMPLETE CBC W/AUTO DIFF WBC: CPT | Performed by: INTERNAL MEDICINE

## 2024-05-25 PROCEDURE — 83550 IRON BINDING TEST: CPT | Performed by: INTERNAL MEDICINE

## 2024-05-25 PROCEDURE — 80048 BASIC METABOLIC PNL TOTAL CA: CPT | Performed by: INTERNAL MEDICINE

## 2024-05-25 PROCEDURE — C9113 INJ PANTOPRAZOLE SODIUM, VIA: HCPCS | Performed by: INTERNAL MEDICINE

## 2024-05-25 PROCEDURE — 82728 ASSAY OF FERRITIN: CPT | Performed by: INTERNAL MEDICINE

## 2024-05-25 PROCEDURE — 99232 SBSQ HOSP IP/OBS MODERATE 35: CPT | Performed by: INTERNAL MEDICINE

## 2024-05-25 RX ADMIN — SODIUM CHLORIDE 8 MG/HR: 9 INJECTION, SOLUTION INTRAVENOUS at 07:42

## 2024-05-25 RX ADMIN — IRON SUCROSE 300 MG: 20 INJECTION, SOLUTION INTRAVENOUS at 15:46

## 2024-05-25 RX ADMIN — SODIUM CHLORIDE 100 ML/HR: 0.9 INJECTION, SOLUTION INTRAVENOUS at 01:40

## 2024-05-25 RX ADMIN — LORATADINE 10 MG: 10 TABLET ORAL at 09:35

## 2024-05-25 RX ADMIN — CYANOCOBALAMIN TAB 500 MCG 1000 MCG: 500 TAB at 09:35

## 2024-05-25 RX ADMIN — ATORVASTATIN CALCIUM 40 MG: 40 TABLET, FILM COATED ORAL at 17:00

## 2024-05-25 RX ADMIN — SODIUM CHLORIDE 8 MG/HR: 9 INJECTION, SOLUTION INTRAVENOUS at 18:13

## 2024-05-25 NOTE — ASSESSMENT & PLAN NOTE
2/2 Ulcerated previous Frank-en-Y gastric bypass in the body of the stomach; placed 2 clips successfully (clips are MRI conditional); hemostasis achieved; injected 2.5 mL of epinephrine to address bleeding .   Has history of NSAID use which could also be the cause of the patient's GI bleeding.  Currently on PPI drip.  Continue the same.  GI on board.  On clear liquid diet and advance as per GI recommendations.  Hemoglobin dropped to 6.6 today however possibly from prior bleeding.  No active bleeding suspected again.  Monitor closely.  Blood pressure stable.  Will transfuse 1 unit of PRBC and check another hemoglobin at around 3:30 PM today.  Transfuse if hemoglobin still less than 7.

## 2024-05-25 NOTE — ASSESSMENT & PLAN NOTE
Blood pressure is reviewed and acceptable.  BP Readings from Last 3 Encounters:   05/25/24 103/70   05/09/24 156/92   07/01/21 151/93       Patient is not currently on any antihypertensive medications.  Monitor blood pressure.

## 2024-05-25 NOTE — PLAN OF CARE

## 2024-05-25 NOTE — PROGRESS NOTES
"Progress Note - Jay Smith 59 y.o. male MRN: [id number]    Unit/Bed#: S -01 Encounter: [id number]    Assessment and Plan:     59-year-old male with history of Frank-en-Y gastric bypass with recent Excedrin use as well as recently placed on aspirin for suspected TIA who presented with melena and drop in hemoglobin.    Acute blood loss anemia with melena  Gastric bypass  EGD performed yesterday 5/24 with anastomotic ulcer with visible vessel status post clip placement x 2 and injection of epinephrine.  Hemoglobin slightly down trended overnight to 6.6.  He will be receiving a unit of blood.  He reports his bowel movements seem to be less dark.  BUN improving.  Vital signs stable.    -Follow-up hemoglobin after transfusion.  - Monitor stool output and Hgb.  - Continue PPI drip for now.  -Continue clear liquid diet for now until patient tolerating more.  - If any drop in hemoglobin overnight after blood transfusion or any recurrence of severe melena, would recommend repeat EGD.  - Otherwise, patient is hopeful to be discharged tomorrow.  If hemoglobin is stable, no melena and tolerating diet could consider discharge tomorrow.  - Discussed with patient the importance of avoiding all NSAIDs including Excedrin  -Patient will need repeat EGD in 3 months with PPI twice daily until then.    ----------------------------------------------------------------------------------------------------------------    Subjective:     Patient reports he is doing well.  He had bowel movement overnight which he reports seemed to be less dark.  No nausea or vomiting.  Appetite still poor.    Objective:     Vitals: Blood pressure 103/70, pulse 78, temperature 98.3 °F (36.8 °C), resp. rate 18, height 5' 11\" (1.803 m), weight 95.3 kg (210 lb), SpO2 97%.,Body mass index is 29.29 kg/m².      Intake/Output Summary (Last 24 hours) at 5/25/2024 1049  Last data filed at 5/25/2024 0140  Gross per 24 hour   Intake 2043.34 ml   Output --   Net " "2043.34 ml       Physical Exam:     General Appearance: Alert, appears stated age and cooperative  Lungs: Clear to auscultation bilaterally, no rales or rhonchi, no labored breathing/accessory muscle use  Heart: Regular rate and rhythm, S1, S2 normal, no murmur, click, rub or gallop  Abdomen: Soft, non-tender, non-distended; bowel sounds normal; no masses or no organomegaly  Extremities: No cyanosis, clubbing, or edema    Invasive Devices       Peripheral Intravenous Line  Duration             Peripheral IV 05/24/24 Left;Proximal;Ventral (anterior) Forearm 1 day    Peripheral IV 05/24/24 Right Wrist 1 day                    Lab Results:  Results from last 7 days   Lab Units 05/25/24  0520   WBC Thousand/uL 4.72   HEMOGLOBIN g/dL 6.6*   HEMATOCRIT % 20.8*   PLATELETS Thousands/uL 142*   SEGS PCT % 69   LYMPHO PCT % 23   MONO PCT % 6   EOS PCT % 1     Results from last 7 days   Lab Units 05/25/24  0520 05/24/24  0426   POTASSIUM mmol/L 4.1 4.3   CHLORIDE mmol/L 114* 108   CO2 mmol/L 25 25   BUN mg/dL 26* 44*   CREATININE mg/dL 0.92 1.06   CALCIUM mg/dL 7.7* 8.3*   ALK PHOS U/L  --  75   ALT U/L  --  8   AST U/L  --  14     Invalid input(s): \"BILI\"  Results from last 7 days   Lab Units 05/24/24  0426   INR  1.09     Results from last 7 days   Lab Units 05/24/24  0426   LIPASE u/L 10*       Imaging Studies: I have personally reviewed pertinent imaging studies.    EGD    Result Date: 5/24/2024  Impression: Anastomotic ulcer with vissible vessle, 2 clips placed and 2.5cc of epinephrince injected No active bleeding Normal jejunum and esophagus RECOMMENDATION: Schedule repeat EGD Repeat EGD in 2 months Return to floor Continue pantoprazole drip Clear liquids Follow hemoglobin Repeat scope if recurrent bleeding   Matteo España MD     CT high volume bleeding scan abdomen pelvis    Result Date: 5/24/2024  Impression: No CT evidence of active high-volume gastrointestinal hemorrhage. Prior gastric bypass. Small hiatal hernia. " Colonic diverticulosis without evidence of acute diverticulitis. Normal appendix. No bowel obstruction. The prostate is somewhat prominent and causes some mild nodular appearing indentation upon the base of the urinary bladder. Clinical and laboratory (PSA) correlation recommended. Small pericardial effusion. Other nonemergent findings as above. The study was marked in EPIC for immediate notification. Workstation performed: [id number]

## 2024-05-25 NOTE — ASSESSMENT & PLAN NOTE
Secondary to GI bleed.  Transfuse 1 unit of PRBC for hemoglobin 6.6 today.  Baseline hemoglobin around 13.  Check another hemoglobin at 3:30 PM today.  If hemoglobin less than 7 will transfuse again 1 more unit.

## 2024-05-25 NOTE — PROGRESS NOTES
[facility]  Progress Note  Name: Jay Smith I  MRN: [id number]  Unit/Bed#: S -01 I Date of Admission: 5/24/2024   Date of Service: 5/25/2024 I Hospital Day: 1    Assessment & Plan   * GI bleed  Assessment & Plan  2/2 Ulcerated previous Frank-en-Y gastric bypass in the body of the stomach; placed 2 clips successfully (clips are MRI conditional); hemostasis achieved; injected 2.5 mL of epinephrine to address bleeding .   Has history of NSAID use which could also be the cause of the patient's GI bleeding.  Currently on PPI drip.  Continue the same.  GI on board.  On clear liquid diet and advance as per GI recommendations.  Hemoglobin dropped to 6.6 today however possibly from prior bleeding.  No active bleeding suspected again.  Monitor closely.  Blood pressure stable.  Will transfuse 1 unit of PRBC and check another hemoglobin at around 3:30 PM today.  Transfuse if hemoglobin still less than 7.    Acute blood loss anemia  Assessment & Plan  Secondary to GI bleed.  Transfuse 1 unit of PRBC for hemoglobin 6.6 today.  Baseline hemoglobin around 13.  Check another hemoglobin at 3:30 PM today.  If hemoglobin less than 7 will transfuse again 1 more unit.    History of gastric bypass  Assessment & Plan  Noted in history.    Pericardial effusion  Assessment & Plan  CT A/P: Small pericardial effussion < 7mm  ECHO from 05/09/2024: LVEF 65%, mild concentric hypertrophy.  Continue to monitor     Elevated serum glucose  Assessment & Plan  POA, serum glucose of 155  Recent A1C 5.4 on 05/04/2024.    TIA (transient ischemic attack)  Assessment & Plan  Recently hospitalization from 05/08/2024-05/09/2024 due to complaints of confusion and difficulty recalling things at which time etiology was suspected to be likely TIA versus hypertensive urgency versus atypical migraine. Patient was discharged home on Aspirin 81 mg daily and Atorvastatin 40 mg daily.   Hold ASA at this time due to current bloody  bowel movements.  May rec re-starting Statin tomorrow but hold ASA on d/c.     HTN (hypertension)  Assessment & Plan  Blood pressure is reviewed and acceptable.  BP Readings from Last 3 Encounters:   05/25/24 103/70   05/09/24 156/92   07/01/21 151/93       Patient is not currently on any antihypertensive medications.  Monitor blood pressure.                 Mobility:     Basic Mobility Inpatient Raw Score: 24  JH-HLM Goal: 8: Walk 250 feet or more  JH-HLM Achieved: 8: Walk 250 feet ot more  HLM Goal achieved. Continue to encourage appropriate mobility.    Pharmacologic VTE Prophylaxis: No 2/2 GI bleed.   Mechanical VTE Prophylaxis in Place: yes, SCDs  Patient Centered Rounds: I have performed bedside rounds with the Nursing staff today.   Current Length of Stay: 1 day(s)  Current Patient Status: Inpatient   Code Status: Level 1 - Full Code  Time Spent for Care:  35 minutes.  More than 50% of total time spent on counseling and coordination of care as described above.  Discussions with Specialists or Other Care Team Provider: Yes GI team.   Education and Discussions with Family / Patient: No, tried to contact patient's wife but not able to reach.  Discharge Plan: 24-48 hrs. GI bleed  Case Discussed with  regarding updating plan of care and disposition planning.   Certification Statement: The patient will continue to require additional inpatient hospital stay due to Gi bleed, Anemia.     Subjective:   I have seen and Examined the patient at the bedside. No CP or Sob. No fevers or chills, No nausea or vomiting. Overnight events reviewed with the RN.  patient had some dizziness when he tried to have a bowel  Yesterday patient had some movement.  Small amounts of bloody bowel movement however they were less dark.  However no vomiting.  Denies any abdominal pain or epigastric pain.  Appetite is poor.    Review of System:   Denies any CP or SOB  Denies any Cough or Cold  Denies any Fevers or chills.   Denies  any focal tingling numbness or weakness in any extremities.     Objective:   Temp (24hrs), Av.3 °F (36.8 °C), Min:98.1 °F (36.7 °C), Max:98.5 °F (36.9 °C)    Temp:  [98.1 °F (36.7 °C)-98.5 °F (36.9 °C)] 98.3 °F (36.8 °C)  HR:  [64-93] 78  Resp:  [16-18] 18  BP: (103-140)/(57-76) 103/70  SpO2:  [97 %-100 %] 97 %  Body mass index is 29.29 kg/m².     Input and Output Summary (last 24 hours):     Intake/Output Summary (Last 24 hours) at 2024 1430  Last data filed at 2024 0140  Gross per 24 hour   Intake 1743.34 ml   Output --   Net 1743.34 ml     I/O          0701   0700  0701   0700  0701   0700    I.V. (mL/kg)  2043.3 (21.4)     IV Piggyback 1100      Total Intake(mL/kg) 1100 (11.1) 2043.3 (21.4)     Net +1100 +2043.3                    Physical Exam:   General : Alert, Awake and oriented x 2-3, NAD.   Neck : Supple.   Eyes:  POLLY, EOMI. conjunctiva pallor noted.  CVS : S1, S2, RRR.   R/S : Clear to auscultate anteriorly.   Abd: Soft, NT, ND. Bs+ve  Extremity: Trace pedal edema noted.   Skin: No acute Rash noted.   CNS: No acute FND.     Additional Data:     Labs, Culture & Imaging Data Reviewed:    Results from last 7 days   Lab Units 24  0520   WBC Thousand/uL 4.72   HEMOGLOBIN g/dL 6.6*   HEMATOCRIT % 20.8*   PLATELETS Thousands/uL 142*     Results from last 7 days   Lab Units 24  0520 24  0426   POTASSIUM mmol/L 4.1 4.3   CHLORIDE mmol/L 114* 108   CO2 mmol/L 25 25   BUN mg/dL 26* 44*   CREATININE mg/dL 0.92 1.06   CALCIUM mg/dL 7.7* 8.3*   ALK PHOS U/L  --  75   ALT U/L  --  8   AST U/L  --  14     Results from last 7 days   Lab Units 24  0426   INR  1.09     Lab Results   Component Value Date    HGBA1C 5.4 2024      CT high volume bleeding scan abdomen pelvis   Final Result by Lia Soriano MD (623)      No CT evidence of active high-volume gastrointestinal hemorrhage.      Prior gastric bypass. Small hiatal hernia. Colonic  "diverticulosis without evidence of acute diverticulitis. Normal appendix. No bowel obstruction.      The prostate is somewhat prominent and causes some mild nodular appearing indentation upon the base of the urinary bladder. Clinical and laboratory (PSA) correlation recommended.      Small pericardial effusion.      Other nonemergent findings as above.      The study was marked in EPIC for immediate notification.      Workstation performed: [id number]             Cultures:   Blood Culture: No results found for: \"BLOODCX\"  Urine Culture: No results found for: \"URINECX\"  Sputum Culture: No components found for: \"SPUTUMCX\"  Wound Culture: No results found for: \"WOUNDCULT\"    Last 24 Hours Medication List:   Current Facility-Administered Medications   Medication Dose Route Frequency Provider Last Rate    atorvastatin  40 mg Oral QPM Matteo España MD      cyanocobalamin  1,000 mcg Oral Daily Elisabeth Gutiérrez MD      fluticasone  2 spray Each Nare Daily Elisabeth Gutiérrez MD      iron sucrose  300 mg Intravenous Once Elisabeth Gutiérrez MD      loratadine  10 mg Oral Daily Elisabeth Gutiérrez MD      pantoprazole (PROTONIX) 80 mg in sodium chloride 0.9 % 100 mL infusion  8 mg/hr Intravenous Continuous Matteo España MD 8 mg/hr (05/25/24 0742)         Patient is at moderate risk for morbidity and mortality due to above mentioned illness and comorbidities.           "

## 2024-05-25 NOTE — UTILIZATION REVIEW
Initial Clinical Review    Admission: Date/Time/Statement:   Admission Orders (From admission, onward)       Ordered        05/24/24 0659  INPATIENT ADMISSION  Once                          Orders Placed This Encounter   Procedures    INPATIENT ADMISSION     Standing Status:   Standing     Number of Occurrences:   1     Order Specific Question:   Level of Care     Answer:   Med Surg [16]     Order Specific Question:   Estimated length of stay     Answer:   More than 2 Midnights     Order Specific Question:   Certification     Answer:   I certify that inpatient services are medically necessary for this patient for a duration of greater than two midnights. See H&P and MD Progress Notes for additional information about the patient's course of treatment.     ED Arrival Information       Expected   -    Arrival   5/24/2024 04:02    Acuity   Urgent              Means of arrival   Walk-In    Escorted by   Family Member    Service   Hospitalist    Admission type   Emergency              Arrival complaint   Abd Pain, Dizziness, Rectal Bleeding             Chief Complaint   Patient presents with    Abdominal Pain     Lower abdominal cramping since yesterday. Went to the bathroom this morning and noticed blood and diarrhea in toilet. Was recently put on aspirin for TIA/stroke like symptoms. Reports mild nausea with no vomiting. Reports dizziness that started yesterday.        Initial Presentation: 59 y.o. male  with a PMH of HTN and s/p gastric bypass who presents with complaints of intermittent, lower abdominal cramping since Monday. This pain was worse with standing and relieved with sitting. He reports this has since resolved. He states that last night he began to have bloody bowel movements. He states he had 3 bowel movements with dark, red blood and minimal stool since yesterday. He then had an additional 2 episodes with larger amounts of blood in stool prompting him to present to the ED for further evaluation. He reports  associated nausea without vomiting, dizziness, and fatigue. Of note, patient was recently started on 81 mg of Aspirin during hospitalization for TIA workup. He has been taking OTC excedrin twice daily  for the past 2 days. Upon evaluation in the ED, patient was found to have hemoglobin of 9.8, with prior hemoglobin being 13.7 on discharge, 5/9. Plan: Inpatient admission for evaluation and treatment of GI bleed, hx of gastric bypass, pericardial effusion, elevated serum glucose, TIA, HTN: NPO for possible EGD/colonoscopy, IV fluids, IV Protonix drip, hold ASA, GI consult.     GI consult: NPO, IV Protonix drip, monitor Hgb, plan for EGD.     Anticipated Length of Stay/Certification Statement: Patient will be admitted on an inpatient basis with an anticipated length of stay of greater than 2 midnights secondary to GI bleed.     Date: 5/25   Day 2:     GI: EGD performed yesterday 5/24 with anastomotic ulcer with visible vessel status post clip placement x 2 and injection of epinephrine. Hemoglobin slightly down trended overnight to 6.6. He will be receiving a unit of blood. He reports his bowel movements seem to be less dark. Follow-up hemoglobin after transfusion. Continue PPI drip for now. Clear liquid diet until pt tolerating more. If any drop in hemoglobin overnight after blood transfusion or any recurrence of severe melena, would recommend repeat EGD.     Internal medicine: Continue IV PPI drip. Hemoglobin dropped to 6.6 today however possibly from prior bleeding. No active bleeding suspected again. Monitor closely. Blood pressure stable. Will transfuse 1 unit of PRBC and check another hemoglobin at around 3:30 PM today. Transfuse if hemoglobin still less than 7.     ED Triage Vitals   Temperature Pulse Respirations Blood Pressure SpO2   05/24/24 0420 05/24/24 0414 05/24/24 0413 05/24/24 0413 05/24/24 0413   97.9 °F (36.6 °C) 94 18 110/71 95 %      Temp Source Heart Rate Source Patient Position - Orthostatic VS BP  Location FiO2 (%)   05/24/24 0420 05/24/24 0413 05/24/24 0413 05/24/24 0413 --   Oral Monitor Sitting Right arm       Pain Score       05/24/24 0420       1          Wt Readings from Last 1 Encounters:   05/24/24 95.3 kg (210 lb)     Additional Vital Signs:     Date/Time Temp Pulse Resp BP MAP (mmHg) SpO2 O2 Device   05/25/24 10:29:23 98.3 °F (36.8 °C) 78 18 103/70 81 97 % --   05/25/24 09:49:04 98.2 °F (36.8 °C) 82 18 119/71 87 99 % --   05/25/24 0938 98.5 °F (36.9 °C) 93 18 140/76 -- -- --   05/25/24 07:20:14 98.3 °F (36.8 °C) 76 18 126/65 85 99 % --   05/24/24 22:30:52 98.1 °F (36.7 °C) 64 16 108/57 74 97 % None (Room air)   05/24/24 19:04:39 -- 76 16 117/63 81 98 % None (Room air)   05/24/24 1800 -- 79 16 120/69 86 98 % None (Room air)   05/24/24 1700 98.2 °F (36.8 °C) 69 16 108/64 79 97 % None (Room air)   05/24/24 16:57:44 -- 80 -- 108/64 79 100 % None (Room air)   05/24/24 1553 -- -- -- -- -- 100 % None (Room air)   05/24/24 1550 -- 75 16 111/59 76 99 % None (Room air)   05/24/24 15:30:03 98.2 °F (36.8 °C) 77 16 119/62 81 98 % None (Room air)   05/24/24 1500 98.3 °F (36.8 °C) 70 16 137/68 91 97 % None (Room air)   05/24/24 1349 -- 75 16 100/59 -- 98 % None (Room air)   05/24/24 1325 -- 72 189 Abnormal  101/59 -- 99 % None (Room air)   05/24/24 1258 -- 76 15 105/55 -- 97 % --   05/24/24 1240 -- 75 15 113/56 -- 98 % None (Room air)   05/24/24 1221 -- 80 15 112/62 -- 97 % None (Room air)   05/24/24 1211 97.6 °F (36.4 °C) 85 18 93/51 -- 95 % None (Room air)   05/24/24 1139 96.7 °F (35.9 °C) Abnormal  91 18 99/63 -- 100 % None (Room air)   05/24/24 1100 -- 95 18 104/56 74 98 % None (Room air)   05/24/24 1045 -- 75 18 -- -- 98 % None (Room air)   05/24/24 1030 -- 78 17 96/54 70 97 % None (Room air)   05/24/24 1015 -- 80 -- -- -- 97 % --   05/24/24 0815 -- 88 -- -- -- 95 % --   05/24/24 0800 -- 92 18 101/59 77 94 % None (Room air)   05/24/24 0745 -- 84 19 113/56 79 95 % None (Room air)   05/24/24 0522 -- 82 --  117/61 82 -- --   05/24/24 0430 -- 96 -- 117/73 89 98 % None (Room air)   05/24/24 0422 -- 88 -- 110/71 84 99 % --   05/24/24 0420 97.9 °F (36.6 °C) -- -- -- -- -- --   05/24/24 0414 -- 94 -- -- -- -- --     Pertinent Labs/Diagnostic Test Results:   CT high volume bleeding scan abdomen pelvis   Final Result by Lia Soriano MD (05/24 0623)      No CT evidence of active high-volume gastrointestinal hemorrhage.      Prior gastric bypass. Small hiatal hernia. Colonic diverticulosis without evidence of acute diverticulitis. Normal appendix. No bowel obstruction.      The prostate is somewhat prominent and causes some mild nodular appearing indentation upon the base of the urinary bladder. Clinical and laboratory (PSA) correlation recommended.      Small pericardial effusion.      Other nonemergent findings as above.      The study was marked in EPIC for immediate notification.      Workstation performed: [id number]           5/24 EGD:  IMPRESSION:  Anastomotic ulcer with vissible vessle, 2 clips placed and 2.5cc of epinephrince injected  No active bleeding  Normal jejunum and esophagus      Results from last 7 days   Lab Units 05/25/24  0520 05/25/24  0042 05/24/24  1804 05/24/24  0949 05/24/24  0426   WBC Thousand/uL 4.72  --   --   --  8.69   HEMOGLOBIN g/dL 6.6* 6.5* 7.4* 8.1* 9.8*   HEMATOCRIT % 20.8*  --   --   --  30.3*   PLATELETS Thousands/uL 142*  --   --   --  238   TOTAL NEUT ABS Thousands/µL 3.28  --   --   --  6.53         Results from last 7 days   Lab Units 05/25/24  0520 05/24/24  0426   SODIUM mmol/L 141 140   POTASSIUM mmol/L 4.1 4.3   CHLORIDE mmol/L 114* 108   CO2 mmol/L 25 25   ANION GAP mmol/L 2* 7   BUN mg/dL 26* 44*   CREATININE mg/dL 0.92 1.06   EGFR ml/min/1.73sq m 91 76   CALCIUM mg/dL 7.7* 8.3*     Results from last 7 days   Lab Units 05/24/24  0426   AST U/L 14   ALT U/L 8   ALK PHOS U/L 75   TOTAL PROTEIN g/dL 5.5*   ALBUMIN g/dL 3.6   TOTAL BILIRUBIN mg/dL 0.91         Results from  last 7 days   Lab Units 05/25/24  0520 05/24/24  0426   GLUCOSE RANDOM mg/dL 105 155*           Results from last 7 days   Lab Units 05/24/24  0426   PROTIME seconds 14.7*   INR  1.09   PTT seconds 27           Results from last 7 days   Lab Units 05/24/24  0426   FERRITIN ng/mL 15*   IRON SATURATION % 12*   IRON ug/dL 42*   TIBC ug/dL 341         Results from last 7 days   Lab Units 05/25/24  0920 05/25/24  0830   UNIT PRODUCT CODE  [id number]   UNIT NUMBER  [id number]-* [id number]-*   UNITABO  B B   UNITRH  POS POS   CROSSMATCH  Compatible Compatible   UNIT DISPENSE STATUS  Issued Return to Inv   UNIT PRODUCT VOL ml 350 350         Results from last 7 days   Lab Units 05/24/24  0426   LIPASE u/L 10*         ED Treatment:   Medication Administration from 05/24/2024 0402 to 05/24/2024 1218         Date/Time Order Dose Route Action     05/24/2024 0426 EDT sodium chloride 0.9 % bolus 1,000 mL 1,000 mL Intravenous New Bag     05/24/2024 0450 EDT pantoprazole (PROTONIX) 80 mg in sodium chloride 0.9 % 100 mL IVPB 80 mg Intravenous New Bag     05/24/2024 0443 EDT iohexol (OMNIPAQUE) 350 MG/ML injection (SINGLE-DOSE) 100 mL 100 mL Intravenous Given     05/24/2024 0518 EDT pantoprazole (PROTONIX) 80 mg in sodium chloride 0.9 % 100 mL infusion 8 mg/hr Intravenous New Bag     05/24/2024 0946 EDT fluticasone (FLONASE) 50 mcg/act nasal spray 1 spray 1 spray Nasal Given     05/24/2024 0814 EDT sodium chloride 0.9 % infusion 100 mL/hr Intravenous New Bag     05/24/2024 1207 EDT EPINEPHrine (ADRENALIN) injection 2.5 mcg Intravenous Given          Past Medical History:   Diagnosis Date    Hypertension      Present on Admission:   HTN (hypertension)      Admitting Diagnosis: Pericardial effusion [I31.39]  Abdominal pain [R10.9]  GI bleed [K92.2]  Abdominal cramping [R10.9]  Nodular prostate [N40.2]  Age/Sex: 59 y.o. male  Admission Orders:  Scheduled Medications:  atorvastatin, 40 mg, Oral, QPM  cyanocobalamin, 1,000  mcg, Oral, Daily  fluticasone, 2 spray, Each Nare, Daily  iron sucrose, 300 mg, Intravenous, Once  loratadine, 10 mg, Oral, Daily      Date Volume Status   Transfuse Leukoreduced  ml Transfusing     Continuous IV Infusions:  pantoprazole (PROTONIX) 80 mg in sodium chloride 0.9 % 100 mL infusion, 8 mg/hr, Intravenous, Continuous      PRN Meds:       IP CONSULT TO GASTROENTEROLOGY    Network Utilization Review Department  ATTENTION: Please call with any questions or concerns to [phone] and carefully listen to the prompts so that you are directed to the right person. All voicemails are confidential.   For Discharge needs, contact Care Management DC Support Team at [phone] opt. 2  Send all requests for admission clinical reviews, approved or denied determinations and any other requests to dedicated fax number below belonging to the [facility] where the patient is receiving treatment. List of dedicated fax numbers for the Facilities:  FACILITY NAME UR FAX NUMBER   ADMISSION DENIALS (Administrative/Medical Necessity) [phone]   DISCHARGE SUPPORT TEAM (NETWORK) [phone]   PARENT CHILD HEALTH (Maternity/NICU/Pediatrics) [phone]   [facility] [phone]   [facility] [phone]   [facility] [phone]   [facility] [phone]   [facility] [phone]   [facility] [phone]   [facility] [phone]   [facility] [phone]   [facility] [phone]   [facility] [phone]   [facility] [phone]   [facility] [phone]

## 2024-05-25 NOTE — ASSESSMENT & PLAN NOTE
Recently hospitalization from 05/08/2024-05/09/2024 due to complaints of confusion and difficulty recalling things at which time etiology was suspected to be likely TIA versus hypertensive urgency versus atypical migraine. Patient was discharged home on Aspirin 81 mg daily and Atorvastatin 40 mg daily.   Hold ASA at this time due to current bloody bowel movements.  May rec re-starting Statin tomorrow but hold ASA on d/c.

## 2024-05-26 ENCOUNTER — PREP FOR PROCEDURE (OUTPATIENT)
Dept: GASTROENTEROLOGY | Facility: AMBULARY SURGERY CENTER | Age: 59
End: 2024-05-26

## 2024-05-26 VITALS
BODY MASS INDEX: 29.4 KG/M2 | OXYGEN SATURATION: 95 % | HEIGHT: 71 IN | WEIGHT: 210 LBS | SYSTOLIC BLOOD PRESSURE: 137 MMHG | TEMPERATURE: 98.4 F | DIASTOLIC BLOOD PRESSURE: 69 MMHG | HEART RATE: 72 BPM | RESPIRATION RATE: 19 BRPM

## 2024-05-26 DIAGNOSIS — K28.9 MARGINAL ULCER: Primary | ICD-10-CM

## 2024-05-26 PROBLEM — J31.0 CHRONIC RHINITIS: Status: ACTIVE | Noted: 2024-05-26

## 2024-05-26 LAB
ABO GROUP BLD BPU: NORMAL
BASOPHILS # BLD AUTO: 0.05 THOUSANDS/ÂΜL (ref 0–0.1)
BASOPHILS NFR BLD AUTO: 1 % (ref 0–1)
BPU ID: NORMAL
CROSSMATCH: NORMAL
EOSINOPHIL # BLD AUTO: 0.1 THOUSAND/ÂΜL (ref 0–0.61)
EOSINOPHIL NFR BLD AUTO: 2 % (ref 0–6)
ERYTHROCYTE [DISTWIDTH] IN BLOOD BY AUTOMATED COUNT: 14.4 % (ref 11.6–15.1)
HCT VFR BLD AUTO: 23.2 % (ref 36.5–49.3)
HGB BLD-MCNC: 7.6 G/DL (ref 12–17)
IMM GRANULOCYTES # BLD AUTO: 0.02 THOUSAND/UL (ref 0–0.2)
IMM GRANULOCYTES NFR BLD AUTO: 0 % (ref 0–2)
LYMPHOCYTES # BLD AUTO: 0.91 THOUSANDS/ÂΜL (ref 0.6–4.47)
LYMPHOCYTES NFR BLD AUTO: 16 % (ref 14–44)
MCH RBC QN AUTO: 29.2 PG (ref 26.8–34.3)
MCHC RBC AUTO-ENTMCNC: 32.8 G/DL (ref 31.4–37.4)
MCV RBC AUTO: 89 FL (ref 82–98)
MONOCYTES # BLD AUTO: 0.38 THOUSAND/ÂΜL (ref 0.17–1.22)
MONOCYTES NFR BLD AUTO: 7 % (ref 4–12)
NEUTROPHILS # BLD AUTO: 4.25 THOUSANDS/ÂΜL (ref 1.85–7.62)
NEUTS SEG NFR BLD AUTO: 74 % (ref 43–75)
NRBC BLD AUTO-RTO: 0 /100 WBCS
PLATELET # BLD AUTO: 136 THOUSANDS/UL (ref 149–390)
PMV BLD AUTO: 10.8 FL (ref 8.9–12.7)
RBC # BLD AUTO: 2.6 MILLION/UL (ref 3.88–5.62)
UNIT DISPENSE STATUS: NORMAL
UNIT PRODUCT CODE: NORMAL
UNIT PRODUCT VOLUME: 350 ML
UNIT RH: NORMAL
WBC # BLD AUTO: 5.71 THOUSAND/UL (ref 4.31–10.16)

## 2024-05-26 PROCEDURE — 99232 SBSQ HOSP IP/OBS MODERATE 35: CPT | Performed by: INTERNAL MEDICINE

## 2024-05-26 PROCEDURE — 99239 HOSP IP/OBS DSCHRG MGMT >30: CPT | Performed by: INTERNAL MEDICINE

## 2024-05-26 PROCEDURE — 85025 COMPLETE CBC W/AUTO DIFF WBC: CPT | Performed by: INTERNAL MEDICINE

## 2024-05-26 PROCEDURE — C9113 INJ PANTOPRAZOLE SODIUM, VIA: HCPCS | Performed by: INTERNAL MEDICINE

## 2024-05-26 RX ORDER — FERROUS SULFATE 324(65)MG
324 TABLET, DELAYED RELEASE (ENTERIC COATED) ORAL
Qty: 30 TABLET | Refills: 0 | Status: SHIPPED | OUTPATIENT
Start: 2024-05-26

## 2024-05-26 RX ORDER — LORATADINE 10 MG/1
10 TABLET ORAL DAILY
Qty: 30 TABLET | Refills: 0 | Status: SHIPPED | OUTPATIENT
Start: 2024-05-27

## 2024-05-26 RX ORDER — PANTOPRAZOLE SODIUM 40 MG/1
40 TABLET, DELAYED RELEASE ORAL 2 TIMES DAILY
Qty: 60 TABLET | Refills: 0 | Status: SHIPPED | OUTPATIENT
Start: 2024-05-26

## 2024-05-26 RX ORDER — FLUTICASONE PROPIONATE 50 MCG
2 SPRAY, SUSPENSION (ML) NASAL DAILY
Qty: 15.8 ML | Refills: 0 | Status: SHIPPED | OUTPATIENT
Start: 2024-05-27

## 2024-05-26 RX ADMIN — LORATADINE 10 MG: 10 TABLET ORAL at 08:04

## 2024-05-26 RX ADMIN — CYANOCOBALAMIN TAB 500 MCG 1000 MCG: 500 TAB at 08:04

## 2024-05-26 RX ADMIN — SODIUM CHLORIDE 8 MG/HR: 9 INJECTION, SOLUTION INTRAVENOUS at 04:27

## 2024-05-26 NOTE — PLAN OF CARE
Problem: PAIN - ADULT  Goal: Verbalizes/displays adequate comfort level or baseline comfort level  Description: Interventions:  - Encourage patient to monitor pain and request assistance  - Assess pain using appropriate pain scale  - Administer analgesics based on type and severity of pain and evaluate response  - Implement non-pharmacological measures as appropriate and evaluate response  - Consider cultural and social influences on pain and pain management  - Notify physician/advanced practitioner if interventions unsuccessful or patient reports new pain  Outcome: Progressing     Problem: SAFETY ADULT  Goal: Patient will remain free of falls  Description: INTERVENTIONS:  - Educate patient/family on patient safety including physical limitations  - Instruct patient to call for assistance with activity   - Consult OT/PT to assist with strengthening/mobility   - Keep Call bell within reach  - Keep bed low and locked with side rails adjusted as appropriate  - Keep care items and personal belongings within reach  - Initiate and maintain comfort rounds  - Make Fall Risk Sign visible to staff  - Apply yellow socks and bracelet for high fall risk patients  - Consider moving patient to room near nurses station  Outcome: Progressing  Goal: Maintain or return to baseline ADL function  Description: INTERVENTIONS:  -  Assess patient's ability to carry out ADLs; assess patient's baseline for ADL function and identify physical deficits which impact ability to perform ADLs (bathing, care of mouth/teeth, toileting, grooming, dressing, etc.)  - Assess/evaluate cause of self-care deficits   - Assess range of motion  - Assess patient's mobility; develop plan if impaired  - Assess patient's need for assistive devices and provide as appropriate  - Encourage maximum independence but intervene and supervise when necessary  - Involve family in performance of ADLs  - Assess for home care needs following discharge   - Consider OT consult  to assist with ADL evaluation and planning for discharge  - Provide patient education as appropriate  Outcome: Progressing  Goal: Maintains/Returns to pre admission functional level  Description: INTERVENTIONS:  - Perform AM-PAC 6 Click Basic Mobility/ Daily Activity assessment daily.  - Set and communicate daily mobility goal to care team and patient/family/caregiver.   - Collaborate with rehabilitation services on mobility goals if consulted  - Perform Range of Motion   - Reposition patient   - Dangle patient   - Stand patient   - Ambulate patient   - Out of bed to chair   - Out of bed for meals   - Out of bed for toileting  - Record patient progress and toleration of activity level   Outcome: Progressing     Problem: GASTROINTESTINAL - ADULT  Goal: Maintains or returns to baseline bowel function  Description: INTERVENTIONS:  - Assess bowel function  - Encourage oral fluids to ensure adequate hydration  - Administer IV fluids if ordered to ensure adequate hydration  - Administer ordered medications as needed  - Encourage mobilization and activity  - Consider nutritional services referral to assist patient with adequate nutrition and appropriate food choices  Outcome: Progressing  Goal: Maintains adequate nutritional intake  Description: INTERVENTIONS:  - Monitor percentage of each meal consumed  - Identify factors contributing to decreased intake, treat as appropriate  - Assist with meals as needed  - Monitor I&O, weight, and lab values if indicated  - Obtain nutrition services referral as needed  Outcome: Progressing

## 2024-05-26 NOTE — ASSESSMENT & PLAN NOTE
Recently hospitalization from 05/08/2024-05/09/2024 due to complaints of confusion and difficulty recalling things at which time etiology was suspected to be likely TIA versus hypertensive urgency versus atypical migraine. Patient was discharged home on Aspirin 81 mg daily and Atorvastatin 40 mg daily.   Hold ASA at this time due to current bloody bowel movements.  Hold ASA until outpatient follow up

## 2024-05-26 NOTE — ASSESSMENT & PLAN NOTE
Pt with consistent complaints of rhinitis. Flonase increased to 2 puffs/nostril on dc. Referral to ENT placed.

## 2024-05-26 NOTE — DISCHARGE SUMMARY
"[facility]  Discharge- Jay Smith 1965, 59 y.o. male MRN: [id number]  Unit/Bed#: S -01 Encounter: [id number]  Primary Care Provider: David Oakes MD   Date and time admitted to hospital: 5/24/2024  4:07 AM    Acute blood loss anemia  Assessment & Plan  Lab Results   Component Value Date    HGB 7.6 (L) 05/26/2024       Secondary to GI bleed.  Transfused 1 unit of PRBC for hemoglobin 6.6 on arrival.   Currently stable s/p transfusion with no signs of ongoing bleed  EGD 5/24: Anastomotic ulcer with vissible vessle, 2 clips placed and 2.5cc of epinephrince injected. No active bleeding. 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   Has history of NSAID use which could also be the cause of the patient's GI bleeding.  Currently on PPI drip.  Continue the same.  GI on board.  On clear liquid diet and advance as per GI recommendations.  Hemoglobin dropped to 6.6 day of arrival however possibly from prior bleeding.  No active bleeding suspected again.  Monitor closely.  Blood pressure stable.  Will transfuse 1 unit of PRBC and check another hemoglobin at around 3:30 PM today.  Transfuse if hemoglobin still less than 7.  See further plan under \"acute blood loss anemia.\"    Chronic rhinitis  Assessment & Plan  Pt with consistent complaints of rhinitis. Flonase increased to 2 puffs/nostril on dc. Referral to ENT placed.     History of gastric bypass  Assessment & Plan  Noted in history. Advised to avoid NSAIDS.    Pericardial effusion  Assessment & " Plan  CT A/P: Small pericardial effussion < 7mm  ECHO from 05/09/2024: LVEF 65%, mild concentric hypertrophy.  Continue to monitor     Elevated serum glucose  Assessment & Plan  POA, serum glucose of 155  Recent A1C 5.4 on 05/04/2024.  Follow up with PCP    TIA (transient ischemic attack)  Assessment & Plan  Recently hospitalization from 05/08/2024-05/09/2024 due to complaints of confusion and difficulty recalling things at which time etiology was suspected to be likely TIA versus hypertensive urgency versus atypical migraine. Patient was discharged home on Aspirin 81 mg daily and Atorvastatin 40 mg daily.   Hold ASA at this time due to current bloody bowel movements.  Hold ASA until outpatient follow up    HTN (hypertension)  Assessment & Plan  Blood pressure is reviewed and acceptable.  BP Readings from Last 3 Encounters:   05/26/24 137/69   05/09/24 156/92   07/01/21 151/93     Patient is not currently on any antihypertensive medications.  Monitor blood pressure.      Medical Problems       Resolved Problems  Date Reviewed: 5/26/2024   None       Discharging Resident: Iraj Perez DO  Discharging Attending: Elisabeth Gutiérrez MD  PCP: David Oakes MD  Admission Date:   Admission Orders (From admission, onward)       Ordered        05/24/24 0659  INPATIENT ADMISSION  Once                          Discharge Date: 05/26/24    Consultations During Hospital Stay:  GI     Procedures Performed:   EGD    Significant Findings / Test Results:   Anastomotic ulcer with vissible vessle, 2 clips placed and 2.5cc of epinephrince injected. No active bleeding. Normal jejunum and esophagus    Incidental Findings:   None      Test Results Pending at Discharge (will require follow up):  None      Outpatient Tests Requested:  Recommend CBC in 1 week    Complications:  none     Reason for Admission: GI bleed    Hospital Course:   Jay Smith is a 59 y.o. male patient who originally presented to the hospital on 5/24/2024 due  "to lower abdominal cramping and bloody bowel movements. Pt did note frequent use of ibuprofen as well as ASA. Workup on arrival notable for Hgb trended down to 6.5. He was transfused 1 U PRBC and Hgb remained stable in the 7's thereafter. He was started on a protonix drip. He was taken for an EGD 5/24 which noted an anastomotic ulcer, and 2 clips were placed. He remained stable and on 7/26 was deemed stable for discharge with close outpatient PCP and GI follow up, with the recommendation for a follow up CBC in 1 week. He was started on protonix 40 mg daily on discharge as well as iron supplementation.         Please see above list of diagnoses and related plan for additional information.     Condition at Discharge: good    Discharge Day Visit / Exam:   Subjective:  Seen at bedside. No acute complaints and eager for discharge.   Vitals: Blood Pressure: 137/69 (05/26/24 0803)  Pulse: 72 (05/26/24 0803)  Temperature: 98.4 °F (36.9 °C) (05/26/24 0803)  Temp Source: Oral (05/25/24 0949)  Respirations: 19 (05/26/24 0803)  Height: 5' 11\" (180.3 cm) (05/24/24 1553)  Weight - Scale: 95.3 kg (210 lb) (05/24/24 1553)  SpO2: 95 % (05/26/24 0803)  Exam:   Physical Exam  Constitutional:       General: He is not in acute distress.     Appearance: He is not ill-appearing or toxic-appearing.   HENT:      Mouth/Throat:      Pharynx: Oropharynx is clear.   Eyes:      Extraocular Movements: Extraocular movements intact.      Conjunctiva/sclera: Conjunctivae normal.   Cardiovascular:      Rate and Rhythm: Normal rate and regular rhythm.      Pulses: Normal pulses.      Heart sounds: Normal heart sounds.   Pulmonary:      Effort: Pulmonary effort is normal.      Breath sounds: No wheezing, rhonchi or rales.   Abdominal:      Palpations: Abdomen is soft.      Tenderness: There is no abdominal tenderness. There is no guarding.   Musculoskeletal:      Right lower leg: No edema.      Left lower leg: No edema.   Skin:     General: Skin is " warm and dry.      Coloration: Skin is pale.   Neurological:      General: No focal deficit present.      Mental Status: He is alert and oriented to person, place, and time.   Psychiatric:         Mood and Affect: Mood normal.         Behavior: Behavior normal.         Thought Content: Thought content normal.         Judgment: Judgment normal.          Discussion with Family: Patient declined call to .     Discharge instructions/Information to patient and family:   See after visit summary for information provided to patient and family.      Provisions for Follow-Up Care:  See after visit summary for information related to follow-up care and any pertinent home health orders.      Mobility at time of Discharge:   Basic Mobility Inpatient Raw Score: 24  JH-HLM Goal: 8: Walk 250 feet or more  JH-HLM Achieved: 8: Walk 250 feet ot more  HLM Goal achieved. Continue to encourage appropriate mobility.     Disposition:   Home    Planned Readmission: no     Discharge Medications:  See after visit summary for reconciled discharge medications provided to patient and/or family.      **Please Note: This note may have been constructed using a voice recognition system**

## 2024-05-26 NOTE — ASSESSMENT & PLAN NOTE
Blood pressure is reviewed and acceptable.  BP Readings from Last 3 Encounters:   05/26/24 137/69   05/09/24 156/92   07/01/21 151/93     Patient is not currently on any antihypertensive medications.  Monitor blood pressure.

## 2024-05-26 NOTE — PLAN OF CARE
Problem: Potential for Falls  Goal: Patient will remain free of falls  Description: INTERVENTIONS:  - Educate patient/family on patient safety including physical limitations  - Instruct patient to call for assistance with activity   - Consult OT/PT to assist with strengthening/mobility   - Keep Call bell within reach  - Keep bed low and locked with side rails adjusted as appropriate  - Keep care items and personal belongings within reach  - Initiate and maintain comfort rounds  - Make Fall Risk Sign visible to staff  - Apply yellow socks and bracelet for high fall risk patients  - Consider moving patient to room near nurses station  Outcome: Progressing     Problem: PAIN - ADULT  Goal: Verbalizes/displays adequate comfort level or baseline comfort level  Description: Interventions:  - Encourage patient to monitor pain and request assistance  - Assess pain using appropriate pain scale  - Administer analgesics based on type and severity of pain and evaluate response  - Implement non-pharmacological measures as appropriate and evaluate response  - Consider cultural and social influences on pain and pain management  - Notify physician/advanced practitioner if interventions unsuccessful or patient reports new pain  Outcome: Progressing     Problem: INFECTION - ADULT  Goal: Absence or prevention of progression during hospitalization  Description: INTERVENTIONS:  - Assess and monitor for signs and symptoms of infection  - Monitor lab/diagnostic results  - Monitor all insertion sites, i.e. indwelling lines, tubes, and drains  - Monitor endotracheal if appropriate and nasal secretions for changes in amount and color  - [place] appropriate cooling/warming therapies per order  - Administer medications as ordered  - Instruct and encourage patient and family to use good hand hygiene technique  - Identify and instruct in appropriate isolation precautions for identified infection/condition  Outcome: Progressing  Goal: Absence  of fever/infection during neutropenic period  Description: INTERVENTIONS:  - Monitor WBC    Outcome: Progressing     Problem: SAFETY ADULT  Goal: Patient will remain free of falls  Description: INTERVENTIONS:  - Educate patient/family on patient safety including physical limitations  - Instruct patient to call for assistance with activity   - Consult OT/PT to assist with strengthening/mobility   - Keep Call bell within reach  - Keep bed low and locked with side rails adjusted as appropriate  - Keep care items and personal belongings within reach  - Initiate and maintain comfort rounds  - Make Fall Risk Sign visible to staff  - Apply yellow socks and bracelet for high fall risk patients  - Consider moving patient to room near nurses station  Outcome: Progressing  Goal: Maintain or return to baseline ADL function  Description: INTERVENTIONS:  -  Assess patient's ability to carry out ADLs; assess patient's baseline for ADL function and identify physical deficits which impact ability to perform ADLs (bathing, care of mouth/teeth, toileting, grooming, dressing, etc.)  - Assess/evaluate cause of self-care deficits   - Assess range of motion  - Assess patient's mobility; develop plan if impaired  - Assess patient's need for assistive devices and provide as appropriate  - Encourage maximum independence but intervene and supervise when necessary  - Involve family in performance of ADLs  - Assess for home care needs following discharge   - Consider OT consult to assist with ADL evaluation and planning for discharge  - Provide patient education as appropriate  Outcome: Progressing  Goal: Maintains/Returns to pre admission functional level  Description: INTERVENTIONS:  - Perform AM-PAC 6 Click Basic Mobility/ Daily Activity assessment daily.  - Set and communicate daily mobility goal to care team and patient/family/caregiver.   - Collaborate with rehabilitation services on mobility goals if consulted  - Out of bed for  toileting  - Record patient progress and toleration of activity level   Outcome: Progressing     Problem: DISCHARGE PLANNING  Goal: Discharge to home or other facility with appropriate resources  Description: INTERVENTIONS:  - Identify barriers to discharge w/patient and caregiver  - Arrange for needed discharge resources and transportation as appropriate  - Identify discharge learning needs (meds, wound care, etc.)  - Arrange for interpretive services to assist at discharge as needed  - Refer to Case Management Department for coordinating discharge planning if the patient needs post-hospital services based on physician/advanced practitioner order or complex needs related to functional status, cognitive ability, or social support system  Outcome: Progressing     Problem: Knowledge Deficit  Goal: Patient/family/caregiver demonstrates understanding of disease process, treatment plan, medications, and discharge instructions  Description: Complete learning assessment and assess knowledge base.  Interventions:  - Provide teaching at level of understanding  - Provide teaching via preferred learning methods  Outcome: Progressing     Problem: GASTROINTESTINAL - ADULT  Goal: Maintains or returns to baseline bowel function  Description: INTERVENTIONS:  - Assess bowel function  - Encourage oral fluids to ensure adequate hydration  - Administer IV fluids if ordered to ensure adequate hydration  - Administer ordered medications as needed  - Encourage mobilization and activity  - Consider nutritional services referral to assist patient with adequate nutrition and appropriate food choices  Outcome: Progressing  Goal: Maintains adequate nutritional intake  Description: INTERVENTIONS:  - Monitor percentage of each meal consumed  - Identify factors contributing to decreased intake, treat as appropriate  - Assist with meals as needed  - Monitor I&O, weight, and lab values if indicated  - Obtain nutrition services referral as  needed  Outcome: Progressing

## 2024-05-26 NOTE — ASSESSMENT & PLAN NOTE
Lab Results   Component Value Date    HGB 7.6 (L) 05/26/2024       Secondary to GI bleed.  Transfused 1 unit of PRBC for hemoglobin 6.6 on arrival.   Currently stable s/p transfusion with no signs of ongoing bleed  EGD 5/24: Anastomotic ulcer with vissible vessle, 2 clips placed and 2.5cc of epinephrince injected. No active bleeding. Normal jejunum and esophagus    Plan  Stable for discharge with PCP and GI follow up  Advised to avoid NSAIDS  Protonix 40 BID until GI follow up  Daily iron supplementation  Recommend follow up CBC 1 week  Will need follow up EGD outpatient

## 2024-05-26 NOTE — PROGRESS NOTES
"Progress Note - Jay Smith 59 y.o. male MRN: [id number]    Unit/Bed#: S -01 Encounter: [id number]    Assessment and Plan:     59-year-old male with history of Frank-en-Y gastric bypass with recent Excedrin use as well as recently placed on aspirin for suspected TIA who presented with melena and drop in hemoglobin.     Acute blood loss anemia with melena  Gastric bypass  EGD performed 5/24 with anastomotic ulcer with visible vessel status post clip placement x 2 and injection of epinephrine.  Hemoglobin slightly down trended after procedure to 6.6 status post 1 unit of blood.  Now stable at 7.6.  No bowel movements overnight.    -Patient is hopeful for discharge today.  In the setting of stable hemoglobin, no further bowel movements okay for discharge.  -Okay for non ulcerogenic diet  - Discussed the importance of continuing Protonix 40 mg twice daily until repeat EGD in 2 to 3 months.  - Patient is aware to avoid all NSAIDs.  - Patient is aware of symptoms of when to come back to the emergency room including signs of bleeding, weakness, shortness of breath.  -I sent message to office to call patient for repeat EGD    -In regards to patient's aspirin, he was put on this for suspected TIA recently.  Discussed with primary team.  This appears to be not a strong indication based on his presentation however will defer to primary care.     ----------------------------------------------------------------------------------------------------------------    Subjective:     Patient reports he is doing well.  He reports no bowel movements overnight.  He is hopeful for diet advancement and discharge.    Objective:     Vitals: Blood pressure 137/69, pulse 72, temperature 98.4 °F (36.9 °C), resp. rate 19, height 5' 11\" (1.803 m), weight 95.3 kg (210 lb), SpO2 95%.,Body mass index is 29.29 kg/m².      Intake/Output Summary (Last 24 hours) at 5/26/2024 1207  Last data filed at 5/26/2024 0803  Gross per 24 hour   Intake 593 " "ml   Output --   Net 593 ml       Physical Exam:     General Appearance: Sitting comfortably in bed.  No distress.  Lungs: Clear to auscultation bilaterally, no rales or rhonchi, no labored breathing/accessory muscle use  Heart: Regular rate and rhythm, S1, S2 normal, no murmur, click, rub or gallop  Abdomen: Soft, non-tender, non-distended; bowel sounds normal; no masses or no organomegaly  Extremities: No cyanosis, clubbing, or edema    Invasive Devices       Peripheral Intravenous Line  Duration             Peripheral IV 05/26/24 Dorsal (posterior);Left Forearm <1 day                    Lab Results:  Results from last 7 days   Lab Units 05/26/24  0529   WBC Thousand/uL 5.71   HEMOGLOBIN g/dL 7.6*   HEMATOCRIT % 23.2*   PLATELETS Thousands/uL 136*   SEGS PCT % 74   LYMPHO PCT % 16   MONO PCT % 7   EOS PCT % 2     Results from last 7 days   Lab Units 05/25/24  0520 05/24/24  0426   POTASSIUM mmol/L 4.1 4.3   CHLORIDE mmol/L 114* 108   CO2 mmol/L 25 25   BUN mg/dL 26* 44*   CREATININE mg/dL 0.92 1.06   CALCIUM mg/dL 7.7* 8.3*   ALK PHOS U/L  --  75   ALT U/L  --  8   AST U/L  --  14     Invalid input(s): \"BILI\"  Results from last 7 days   Lab Units 05/24/24  0426   INR  1.09     Results from last 7 days   Lab Units 05/24/24  0426   LIPASE u/L 10*       Imaging Studies: I have personally reviewed pertinent imaging studies.    EGD    Result Date: 5/24/2024  Impression: Anastomotic ulcer with vissible vessle, 2 clips placed and 2.5cc of epinephrince injected No active bleeding Normal jejunum and esophagus RECOMMENDATION: Schedule repeat EGD Repeat EGD in 2 months Return to floor Continue pantoprazole drip Clear liquids Follow hemoglobin Repeat scope if recurrent bleeding   Matteo España MD     CT high volume bleeding scan abdomen pelvis    Result Date: 5/24/2024  Impression: No CT evidence of active high-volume gastrointestinal hemorrhage. Prior gastric bypass. Small hiatal hernia. Colonic diverticulosis without " evidence of acute diverticulitis. Normal appendix. No bowel obstruction. The prostate is somewhat prominent and causes some mild nodular appearing indentation upon the base of the urinary bladder. Clinical and laboratory (PSA) correlation recommended. Small pericardial effusion. Other nonemergent findings as above. The study was marked in EPIC for immediate notification. Workstation performed: [id number]

## 2024-05-26 NOTE — ASSESSMENT & PLAN NOTE
"2/2 Ulcerated previous Frank-en-Y gastric bypass in the body of the stomach; placed 2 clips successfully (clips are MRI conditional); hemostasis achieved; injected 2.5 mL of epinephrine to address bleeding .   Has history of NSAID use which could also be the cause of the patient's GI bleeding.  Currently on PPI drip.  Continue the same.  GI on board.  On clear liquid diet and advance as per GI recommendations.  Hemoglobin dropped to 6.6 day of arrival however possibly from prior bleeding.  No active bleeding suspected again.  Monitor closely.  Blood pressure stable.  Will transfuse 1 unit of PRBC and check another hemoglobin at around 3:30 PM today.  Transfuse if hemoglobin still less than 7.  See further plan under \"acute blood loss anemia.\"  "

## 2024-05-26 NOTE — DISCHARGE INSTR - AVS FIRST PAGE
Dear Jay Smith,     It was our pleasure to care for you here at [facility].  It is our hope that we were always able to exceed the expected standards for your care during your stay.  You were hospitalized due to GI bleed.  You were cared for on the south 4th floor by Iraj Perez DO under the service of Elisabeth Gutiérrez MD with the [facility] who covers for your primary care physician (PCP), David Oakes MD, while you were hospitalized.  If you have any questions or concerns related to this hospitalization, you may contact us at .  For follow up as well as any medication refills, we recommend that you follow up with your primary care physician.  A registered nurse will reach out to you by phone within a few days after your discharge to answer any additional questions that you may have after going home.  However, at this time we provide for you here, the most important instructions / recommendations at discharge:     Notable Medication Adjustments -   Stop taking Aspirin and any other NSAID medications (Advil, Motrin, Aleve, etc.). It is recommended that you talk with your PCP regarding whether or not to continue aspirin in the future.   Start taking Protonix 40 mg twice daily  Start taking Ferrous sulfate daily  Start taking Claritin 10 mg daily  Start taking vitamin B12, speak with your PCP about continuing this medication long term .  Increase Flonase to 2 sprays each nostril daily  Testing Required after Discharge -   Recommend follow up CBC in 1 week  ** Please contact your PCP to request testing orders for any of the testing recommended here **  Important follow up information -   Please follow up with your pcp as well as gastroenterology  Your have been provided a referral to ENT to evaluate rhinitis   Other Instructions -   Please call your doctor or return to the hospital if symptoms worsen  Please review this entire  after visit summary as additional general instructions including medication list, appointments, activity, diet, any pertinent wound care, and other additional recommendations from your care team that may be provided for you.      Sincerely,     Iraj Perez, DO

## 2024-05-27 LAB
ATRIAL RATE: 92 BPM
P AXIS: 47 DEGREES
PR INTERVAL: 118 MS
QRS AXIS: 60 DEGREES
QRSD INTERVAL: 82 MS
QT INTERVAL: 334 MS
QTC INTERVAL: 413 MS
T WAVE AXIS: 66 DEGREES
VENTRICULAR RATE: 92 BPM

## 2024-05-27 PROCEDURE — 93010 ELECTROCARDIOGRAM REPORT: CPT | Performed by: INTERNAL MEDICINE

## 2024-05-27 NOTE — ED ATTENDING ATTESTATION
5/24/2024  IJay MD, saw and evaluated the patient. I have discussed the patient with the resident/non-physician practitioner and agree with the resident's/non-physician practitioner's findings, Plan of Care, and MDM as documented in the resident's/non-physician practitioner's note, except where noted. All available labs and Radiology studies were reviewed.  I was present for key portions of any procedure(s) performed by the resident/non-physician practitioner and I was immediately available to provide assistance.       At this point I agree with the current assessment done in the Emergency Department.  I have conducted an independent evaluation of this patient a history and physical is as follows:see h and p above- agree with er resident tx plan / dispo    ED Course  ED Course as of 05/27/24 0713   Fri May 24, 2024   0449 ER MD NOTE- PT SEEN AND THOROUGHLY EVALUATED BY ER MD - CASE D/W- ER RESIDENT- ALL TESTS REVIEWED BY ER MD-  59 YR MALE WITH 2015 ROSARIO EN Y GASTRIC BYPASS- RECENLTY PALCED ON ASA 81 MG For ? Tia2 WEEKS AGO- WAs admitted at [facility] -- since Monday this week with intermitent bilateral lower abd cramping type pain --  which resolved today - since late last night with 3 episodes of painless brbpr with whole toilet bowel maroon - no brown stool- no anal pain - no fevers- no n/v- has never had a colonscopy as per himself- avss- pulse ox 98 % on ra- interpretation is normal- no intervention - pale - mm pink- rrr s1/s2/-cta-b/soft nt/nd- no peritoneal signs- no ascites- no hsm- no cva tenderness- no pulsatile abd mass/bruit/ tenderness- normal non focal neuro exam          Critical Care Time  Procedures

## 2024-05-28 ENCOUNTER — TELEPHONE (OUTPATIENT)
Dept: GASTROENTEROLOGY | Facility: CLINIC | Age: 59
End: 2024-05-28

## 2024-05-28 NOTE — TELEPHONE ENCOUNTER
----- Message from Dee BO sent at 5/28/2024  7:31 AM EDT -----    ----- Message -----  From: Melida March PA-C  Sent: 5/26/2024  12:09 PM EDT  To: Gastroenterology [facility]    Please call patient for repeat EGD with Dr. España in 2 to 3 months for marginal ulcer.  He is not on anticoagulation.  Can be performed at [facility].  Thank you

## 2024-05-28 NOTE — UTILIZATION REVIEW
NOTIFICATION OF ADMISSION DISCHARGE   This is a Notification of Discharge from [facility]. Please be advised that this patient has been discharge from our facility. Below you will find the admission and discharge date and time including the patient’s disposition.   UTILIZATION REVIEW CONTACT:  Rachel Rodriguez  Utilization   Network Utilization Review Department  Phone: [phone] carefully listen to the prompts. All voicemails are confidential.  Email: [email or web address]     ADMISSION INFORMATION  PRESENTATION DATE: 5/24/2024  4:07 AM  OBERVATION ADMISSION DATE:   INPATIENT ADMISSION DATE: 5/24/24  6:59 AM   DISCHARGE DATE: 5/26/2024  2:31 PM   DISPOSITION:Home/Self Care    Network Utilization Review Department  ATTENTION: Please call with any questions or concerns to [phone] and carefully listen to the prompts so that you are directed to the right person. All voicemails are confidential.   For Discharge needs, contact Care Management DC Support Team at [phone] opt. [phone]  Send all requests for admission clinical reviews, approved or denied determinations and any other requests to dedicated fax number below belonging to the campus where the patient is receiving treatment. List of dedicated fax numbers for the Facilities:  FACILITY NAME UR FAX NUMBER   ADMISSION DENIALS (Administrative/Medical Necessity) [phone]   DISCHARGE SUPPORT TEAM ([facility]) [phone]   PARENT CHILD HEALTH (Maternity/NICU/Pediatrics) [phone]   [facility] [phone]   [facility] [phone]   [facility] [phone]   [facility] [phone]   [facility] [phone]   [facility] [phone]   [facility] [phone]   [facility] [phone]    [facility] [phone]   [facility] [phone]   [facility] [phone]   [facility] [phone]

## 2024-05-28 NOTE — TELEPHONE ENCOUNTER
Procedure: EGD  Scheduled date of procedure (as of today):07/25/24  Physician performing procedure:Dr. España  [facility] of [facility]  Instructions reviewed with patient by: ti  Clearances: n/a

## 2024-07-02 ENCOUNTER — CONSULT (OUTPATIENT)
Dept: CARDIOLOGY CLINIC | Facility: CLINIC | Age: 59
End: 2024-07-02
Payer: COMMERCIAL

## 2024-07-02 VITALS
WEIGHT: 221 LBS | TEMPERATURE: 64 F | BODY MASS INDEX: 30.94 KG/M2 | HEIGHT: 71 IN | DIASTOLIC BLOOD PRESSURE: 82 MMHG | OXYGEN SATURATION: 100 % | SYSTOLIC BLOOD PRESSURE: 142 MMHG

## 2024-07-02 DIAGNOSIS — I31.39 PERICARDIAL EFFUSION: ICD-10-CM

## 2024-07-02 DIAGNOSIS — R29.90 STROKE-LIKE SYMPTOMS: ICD-10-CM

## 2024-07-02 DIAGNOSIS — G45.9 TIA (TRANSIENT ISCHEMIC ATTACK): ICD-10-CM

## 2024-07-02 DIAGNOSIS — I10 PRIMARY HYPERTENSION: Primary | ICD-10-CM

## 2024-07-02 DIAGNOSIS — Z98.84 HISTORY OF GASTRIC BYPASS: ICD-10-CM

## 2024-07-02 PROCEDURE — 99203 OFFICE O/P NEW LOW 30 MIN: CPT | Performed by: INTERNAL MEDICINE

## 2024-07-02 NOTE — PROGRESS NOTES
Cardiology Follow Up    Jay Smith  1965  [id number]  [facility]  [address] [facility]  [place] [address] [phone]    1. Primary hypertension  2. TIA (transient ischemic attack)  -     Ambulatory referral to Cardiology  3. Stroke-like symptoms  4. Pericardial effusion  5. History of gastric bypass      Discussion: He feels well.  He denies any recurrence of his symptoms of confusion.  It is not clear that this represented a TIA.  There is no evidence of significant cardiovascular disease.  His initial blood pressure was mildly elevated by our MA, but on repeat by me blood pressure was 122/76.  I did not make any changes in his medical regimen.  He can return as needed.    Cardiovascular History:   Mr. Smith was admitted to the hospital in 5/24 because of an episode of transient confusion at work.  Neurologic workup was negative.  CT scan, CTA, and MRI disclosed no evidence of stroke or vascular disease.  Echocardiography disclosed normal systolic function with mild LVH.  There was no evidence of PFO.  There were no arrhythmias.  Statin therapy was initiated because of mild dyslipidemia, with total cholesterol 186, , HDL 67, and triglycerides 65.  He subsequently did well with no recurrent symptoms.  About 2 weeks after his admission for confusion, he had an episode of upper GI bleeding and was found to have an anastomotic ulcer at the site of prior Frank en Y gastric bypass surgery.  This was treated with endoscopy epinephrine injection, and placement of 2 clips.  Hemoglobin had declined to 6.6 and he received 1 unit of packed red blood cells.  As noted above, he is status post Frank-en-Y gastric bypass bariatric surgery.    Patient Active Problem List   Diagnosis    Concussion    Left shoulder pain    Stroke-like symptoms    HTN (hypertension)    TIA (transient ischemic attack)    GI bleed    Elevated serum  glucose    Pericardial effusion    History of gastric bypass    Acute blood loss anemia    Chronic rhinitis     Past Medical History:   Diagnosis Date    Hypertension      Social History     Socioeconomic History    Marital status: /Civil Union     Spouse name: Not on file    Number of children: Not on file    Years of education: Not on file    Highest education level: Not on file   Occupational History    Not on file   Tobacco Use    Smoking status: Never     Passive exposure: Never    Smokeless tobacco: Never   Vaping Use    Vaping status: Never Used   Substance and Sexual Activity    Alcohol use: Not Currently    Drug use: Never    Sexual activity: Not on file   Other Topics Concern    Not on file   Social History Narrative    Not on file     Social Determinants of Health     Financial Resource Strain: Not on file   Food Insecurity: No Food Insecurity (5/9/2024)    Hunger Vital Sign     Worried About Running Out of Food in the Last Year: Never true     Ran Out of Food in the Last Year: Never true   Transportation Needs: No Transportation Needs (5/9/2024)    PRAPARE - Transportation     Lack of Transportation (Medical): No     Lack of Transportation (Non-Medical): No   Physical Activity: Not on file   Stress: Not on file   Social Connections: Not on file   Intimate Partner Violence: Not on file   Housing Stability: Unknown (5/9/2024)    Housing Stability Vital Sign     Unable to Pay for Housing in the Last Year: No     Number of Times Moved in the Last Year: Not on file     Homeless in the Last Year: No      Family History   Problem Relation Age of Onset    Heart attack Father     Diabetes Father      Past Surgical History:   Procedure Laterality Date    BACK SURGERY      GASTRIC BYPASS      KNEE SURGERY      SHOULDER SURGERY         Current Outpatient Medications:     atorvastatin (LIPITOR) 40 mg tablet, Take 1 tablet (40 mg total) by mouth every evening, Disp: 30 tablet, Rfl: 0    cyanocobalamin (VITAMIN  B-12) 1000 MCG tablet, Take 1 tablet (1,000 mcg total) by mouth daily, Disp: 30 tablet, Rfl: 0    ferrous sulfate 324 (65 Fe) mg, Take 1 tablet (324 mg total) by mouth daily before breakfast, Disp: 30 tablet, Rfl: 0    fluticasone (FLONASE) 50 mcg/act nasal spray, 2 sprays into each nostril daily, Disp: 15.8 mL, Rfl: 0    pantoprazole (PROTONIX) 40 mg tablet, Take 1 tablet (40 mg total) by mouth 2 (two) times a day, Disp: 60 tablet, Rfl: 0    loratadine (CLARITIN) 10 mg tablet, Take 1 tablet (10 mg total) by mouth daily (Patient not taking: Reported on 7/2/2024), Disp: 30 tablet, Rfl: 0  No Known Allergies    Labs: personally reviewed all pertinent labs  Imaging:  personally reviewed all pertinent imaging  Cath:  ECHO:  Stress:  Holter:    Review of Systems:  Review of Systems   Constitutional: Negative.   HENT: Negative.     Eyes: Negative.    Cardiovascular: Negative.    Respiratory: Negative.     Endocrine: Negative.    Hematologic/Lymphatic: Negative.    Skin: Negative.    Musculoskeletal: Negative.    Gastrointestinal: Negative.    Genitourinary: Negative.    Neurological: Negative.    Psychiatric/Behavioral: Negative.     Allergic/Immunologic: Negative.    All other systems reviewed and are negative.      Vitals:    07/02/24 0846   BP: 142/82   Temp: (!) 64 °F (17.8 °C)   SpO2: 100%     Weight (last 2 days)       Date/Time Weight    07/02/24 0846 100 (221)            Physical Exam:  Physical Exam  Vitals reviewed.   Constitutional:       General: He is not in acute distress.     Appearance: He is well-developed. He is not diaphoretic.   HENT:      Head: Normocephalic and atraumatic.   Eyes:      General: No scleral icterus.     Conjunctiva/sclera: Conjunctivae normal.   Neck:      Vascular: No JVD.      Trachea: No tracheal deviation.   Cardiovascular:      Rate and Rhythm: Normal rate and regular rhythm.      Pulses: Intact distal pulses.      Heart sounds: Normal heart sounds. No murmur heard.     No  friction rub. No gallop.   Pulmonary:      Effort: Pulmonary effort is normal. No respiratory distress.      Breath sounds: Normal breath sounds. No stridor. No wheezing or rales.   Chest:      Chest wall: No tenderness.   Abdominal:      General: Bowel sounds are normal. There is no distension.      Palpations: Abdomen is soft.      Tenderness: There is no abdominal tenderness.   Musculoskeletal:         General: No tenderness. Normal range of motion.      Cervical back: Normal range of motion and neck supple.   Skin:     General: Skin is warm and dry.      Findings: No erythema.   Neurological:      Mental Status: He is alert and oriented to person, place, and time.      Cranial Nerves: No cranial nerve deficit.      Coordination: Coordination normal.   Psychiatric:         Behavior: Behavior normal.         Thought Content: Thought content normal.         Judgment: Judgment normal.         Ky Rivas MD

## 2024-07-24 ENCOUNTER — PREP FOR PROCEDURE (OUTPATIENT)
Dept: GASTROENTEROLOGY | Facility: CLINIC | Age: 59
End: 2024-07-24

## 2024-07-24 DIAGNOSIS — K28.9 MARGINAL ULCER: Primary | ICD-10-CM

## 2024-07-25 ENCOUNTER — ANESTHESIA EVENT (OUTPATIENT)
Dept: GASTROENTEROLOGY | Facility: AMBULARY SURGERY CENTER | Age: 59
End: 2024-07-25

## 2024-07-25 ENCOUNTER — TELEPHONE (OUTPATIENT)
Dept: GASTROENTEROLOGY | Facility: CLINIC | Age: 59
End: 2024-07-25

## 2024-07-25 ENCOUNTER — HOSPITAL ENCOUNTER (OUTPATIENT)
Dept: GASTROENTEROLOGY | Facility: AMBULARY SURGERY CENTER | Age: 59
Setting detail: OUTPATIENT SURGERY
End: 2024-07-25
Attending: INTERNAL MEDICINE
Payer: COMMERCIAL

## 2024-07-25 ENCOUNTER — ANESTHESIA (OUTPATIENT)
Dept: GASTROENTEROLOGY | Facility: AMBULARY SURGERY CENTER | Age: 59
End: 2024-07-25

## 2024-07-25 VITALS
SYSTOLIC BLOOD PRESSURE: 128 MMHG | DIASTOLIC BLOOD PRESSURE: 76 MMHG | WEIGHT: 215 LBS | RESPIRATION RATE: 11 BRPM | BODY MASS INDEX: 30.1 KG/M2 | HEIGHT: 71 IN | HEART RATE: 63 BPM | TEMPERATURE: 97 F | OXYGEN SATURATION: 100 %

## 2024-07-25 DIAGNOSIS — K92.2 GI BLEED: ICD-10-CM

## 2024-07-25 DIAGNOSIS — K28.9 MARGINAL ULCER: ICD-10-CM

## 2024-07-25 PROCEDURE — 88305 TISSUE EXAM BY PATHOLOGIST: CPT | Performed by: PATHOLOGY

## 2024-07-25 PROCEDURE — 43239 EGD BIOPSY SINGLE/MULTIPLE: CPT | Performed by: INTERNAL MEDICINE

## 2024-07-25 RX ORDER — LIDOCAINE HYDROCHLORIDE 10 MG/ML
INJECTION, SOLUTION EPIDURAL; INFILTRATION; INTRACAUDAL; PERINEURAL AS NEEDED
Status: DISCONTINUED | OUTPATIENT
Start: 2024-07-25 | End: 2024-07-25

## 2024-07-25 RX ORDER — PROPOFOL 10 MG/ML
INJECTION, EMULSION INTRAVENOUS AS NEEDED
Status: DISCONTINUED | OUTPATIENT
Start: 2024-07-25 | End: 2024-07-25

## 2024-07-25 RX ORDER — SODIUM CHLORIDE, SODIUM LACTATE, POTASSIUM CHLORIDE, CALCIUM CHLORIDE 600; 310; 30; 20 MG/100ML; MG/100ML; MG/100ML; MG/100ML
INJECTION, SOLUTION INTRAVENOUS CONTINUOUS PRN
Status: DISCONTINUED | OUTPATIENT
Start: 2024-07-25 | End: 2024-07-25

## 2024-07-25 RX ADMIN — PROPOFOL 120 MG: 10 INJECTION, EMULSION INTRAVENOUS at 09:41

## 2024-07-25 RX ADMIN — SODIUM CHLORIDE, SODIUM LACTATE, POTASSIUM CHLORIDE, AND CALCIUM CHLORIDE: .6; .31; .03; .02 INJECTION, SOLUTION INTRAVENOUS at 08:54

## 2024-07-25 RX ADMIN — PROPOFOL 50 MG: 10 INJECTION, EMULSION INTRAVENOUS at 09:44

## 2024-07-25 RX ADMIN — LIDOCAINE HYDROCHLORIDE 50 MG: 10 INJECTION, SOLUTION EPIDURAL; INFILTRATION; INTRACAUDAL at 09:41

## 2024-07-25 NOTE — ANESTHESIA POSTPROCEDURE EVALUATION
Post-Op Assessment Note    CV Status:  Stable  Pain Score: 0    Pain management: adequate       Mental Status:  Alert and awake   Hydration Status:  Euvolemic   PONV Controlled:  Controlled   Airway Patency:  Patent     Post Op Vitals Reviewed: Yes    No anethesia notable event occurred.    Staff: CRNA               /68 (07/25/24 0947)    Temp      Pulse 66 (07/25/24 0947)   Resp 15 (07/25/24 0947)    SpO2 99 % (07/25/24 0947)

## 2024-07-25 NOTE — TELEPHONE ENCOUNTER
----- Message from Matteo España MD sent at 7/25/2024 10:35 AM EDT -----  Please call patient, schedule him for colonoscopy, no rush for screening.  Dulcolax MiraLAX bowel prep.

## 2024-07-25 NOTE — ANESTHESIA PREPROCEDURE EVALUATION
Procedure:  EGD    Relevant Problems   ANESTHESIA (within normal limits)      CARDIO   (+) HTN (hypertension)      NEURO/PSYCH   (+) TIA (transient ischemic attack)        TTE 5/9/2024    Left Ventricle: Left ventricular cavity size is normal. Wall thickness is mildly increased. There is mild concentric hypertrophy. The left ventricular ejection fraction is 65%. Systolic function is normal. Wall motion is normal. Diastolic function is normal.    Atrial Septum: No patent foramen ovale detected, confirmed at rest using agitated saline contrast, confirmed by provocation with cough, using agitated saline contrast.      Physical Exam    Airway    Mallampati score: III  TM Distance: >3 FB  Neck ROM: full     Dental   No notable dental hx     Cardiovascular      Pulmonary      Other Findings        Anesthesia Plan  ASA Score- 3     Anesthesia Type- IV sedation with anesthesia with ASA Monitors.         Additional Monitors:     Airway Plan:            Plan Factors-Exercise tolerance (METS): >4 METS.    Chart reviewed. EKG reviewed.  Existing labs reviewed. Patient summary reviewed.    Patient is not a current smoker.              Induction- intravenous.    Postoperative Plan-         Informed Consent- Anesthetic plan and risks discussed with patient.  I personally reviewed this patient with the CRNA. Discussed and agreed on the Anesthesia Plan with the CRNA..

## 2024-07-25 NOTE — H&P
"History and Physical -  [facility]  Jay Smith 59 y.o. male MRN: [id number]    HPI: Jay Smith is a 59 y.o. year old male who presents with follow up of marginal ulcer.      Review of Systems    Historical Information   Past Medical History:   Diagnosis Date    Hypertension      Past Surgical History:   Procedure Laterality Date    BACK SURGERY      GASTRIC BYPASS      KNEE SURGERY      SHOULDER SURGERY       Social History   Social History     Substance and Sexual Activity   Alcohol Use Not Currently     Social History     Substance and Sexual Activity   Drug Use Never     Social History     Tobacco Use   Smoking Status Never    Passive exposure: Never   Smokeless Tobacco Never     Family History   Problem Relation Age of Onset    Heart attack Father     Diabetes Father        Meds/Allergies     Not in a hospital admission.    No Known Allergies    Objective     /92   Pulse 55   Temp (!) 97.1 °F (36.2 °C) (Temporal)   Resp 18   Ht 5' 11\" (1.803 m)   Wt 97.5 kg (215 lb)   SpO2 97%   BMI 29.99 kg/m²       PHYSICAL EXAM    Gen: NAD  CV: RRR  CHEST: Clear  ABD: soft, NT/ND  EXT: no edema  Neuro: AAO      ASSESSMENT/PLAN:  This is a 59 y.o. year old male here for follow up of marginal ulcer.    PLAN:   Procedure: egd      "

## 2024-07-29 ENCOUNTER — PREP FOR PROCEDURE (OUTPATIENT)
Dept: GASTROENTEROLOGY | Facility: CLINIC | Age: 59
End: 2024-07-29

## 2024-07-29 DIAGNOSIS — Z12.11 SCREENING FOR COLON CANCER: Primary | ICD-10-CM

## 2024-07-29 PROCEDURE — 88305 TISSUE EXAM BY PATHOLOGIST: CPT | Performed by: PATHOLOGY

## 2024-11-26 ENCOUNTER — APPOINTMENT (OUTPATIENT)
Dept: RADIOLOGY | Facility: CLINIC | Age: 59
End: 2024-11-26
Payer: COMMERCIAL

## 2024-11-26 VITALS
SYSTOLIC BLOOD PRESSURE: 152 MMHG | WEIGHT: 225 LBS | BODY MASS INDEX: 31.5 KG/M2 | DIASTOLIC BLOOD PRESSURE: 100 MMHG | HEIGHT: 71 IN | HEART RATE: 73 BPM | OXYGEN SATURATION: 98 %

## 2024-11-26 DIAGNOSIS — M79.671 PAIN IN RIGHT FOOT: ICD-10-CM

## 2024-11-26 DIAGNOSIS — M84.30XA STRESS REACTION OF BONE: ICD-10-CM

## 2024-11-26 DIAGNOSIS — M84.374A STRESS REACTION OF RIGHT FOOT, INITIAL ENCOUNTER: Primary | ICD-10-CM

## 2024-11-26 PROCEDURE — 99203 OFFICE O/P NEW LOW 30 MIN: CPT | Performed by: FAMILY MEDICINE

## 2024-11-26 PROCEDURE — 73630 X-RAY EXAM OF FOOT: CPT

## 2024-11-26 NOTE — PROGRESS NOTES
Assessment/Plan:  Assessment & Plan   Diagnoses and all orders for this visit:    Stress reaction of right foot, initial encounter  -     XR foot 3+ vw right; Future  -     Cam Boot    Stress reaction of bone        59-year-old male  with right foot pain more than 4 months duration following injury.  Discussed with patient imaging studies, clinical exam, impression, and plan.  X-rays of the right foot are unremarkable for acute osseous abnormality.  Imaging studies, clinical exam, and history suggest that he has stress reaction of the bone.  I discussed treatment regimen of stabilization/rest and supplements.  I right a cam boot to allow offloading weight from the midfoot/forefoot.  He is to start taking turmeric, tart cherry, vitamin D, and calcium supplements.  He is to do Epsom salt soaks.  He will follow-up in 4 weeks at which point he will be reevaluated.        Subjective:   Patient ID: Jay Smith is a 59 y.o. male.  Chief Complaint   Patient presents with    Right Foot - Pain        59-year-old male presents for evaluation right foot pain more than 4 months duration.  He reports mis-stepping off a rock and feeling pain in the foot.  He had pain described as sudden in onset, localized to the lateral aspect of the foot along the fifth metatarsal, achy and burning, nonradiating, worse with prolonged standing and ambulation, and improved with resting.  He does not recall noticing any bruising.  He reports after being on his feet prolonged duration pain lingers for few hours and he needs to rest.  He does not usually take medication for symptoms.              The following portions of the patient's history were reviewed and updated as appropriate: He  has a past medical history of Hypertension.  He has no known allergies..    Review of Systems   Musculoskeletal:  Positive for arthralgias. Negative for joint swelling.   Neurological:  Negative for weakness and numbness.       Objective:  Chief Complaint    Patient presents with    Right Foot - Pain      Right Ankle Exam   Swelling: none    Range of Motion   The patient has normal right ankle ROM.    Muscle Strength   Dorsiflexion:  5/5  Plantar flexion:  5/5    Other   Sensation: normal  Pulse: present           Observations     Right Ankle/Foot   Negative for deformity.     Tenderness     Right Ankle/Foot   Tenderness in the fifth metatarsal base. No tenderness in the Achilles insertion, anterior ankle, anterior talofibular ligament, calcaneofibular ligament, deltoid ligament, dorsum foot, lateral malleolus, medial malleolus, navicular, peroneal tendon, posterior tibial tendon, posterior talofibular ligament, proximal Achilles and talar dome.     Strength/Myotome Testing     Right Ankle/Foot   Dorsiflexion: 5  Plantar flexion: 5  Inversion: 5  Eversion: 5    Tests     Right Ankle/Foot   Negative for anterior drawer, calcaneal squeeze, metatarsal squeeze, posterior drawer, syndesmosis squeeze and syndesmosis external rotation.       Physical Exam  Vitals and nursing note reviewed.   Constitutional:       Appearance: Normal appearance. He is well-developed. He is not ill-appearing or diaphoretic.   HENT:      Head: Normocephalic and atraumatic.      Right Ear: External ear normal.      Left Ear: External ear normal.   Eyes:      Conjunctiva/sclera: Conjunctivae normal.   Neck:      Trachea: No tracheal deviation.   Cardiovascular:      Rate and Rhythm: Normal rate.   Pulmonary:      Effort: Pulmonary effort is normal. No respiratory distress.   Abdominal:      General: There is no distension.   Musculoskeletal:         General: Tenderness present.      Right ankle:  over the base of 5th metatarsal. No lateral malleolus, medial malleolus, ATF ligament, CF ligament or posterior TF ligament tenderness. Anterior drawer test negative.      Right foot: No deformity.   Skin:     General: Skin is warm and dry.      Coloration: Skin is not jaundiced or pale.   Neurological:       Mental Status: He is alert and oriented to person, place, and time.   Psychiatric:         Mood and Affect: Mood normal.         Behavior: Behavior normal.         Thought Content: Thought content normal.         Judgment: Judgment normal.         I have personally reviewed pertinent films in PACS and my interpretation is  .  No acute osseous abnormality right foot.

## 2024-11-26 NOTE — PATIENT INSTRUCTIONS
Over the counter vitamins:    - Turmeric vitamin at least 1000 mg daily    - Tart cherry vitamin at least 1000 mg daily    - Vitamin D 2000 IU daily    - Calcium 500 mg daily    Epsom Salt Soaks    CAM boot when walking/standing